# Patient Record
Sex: FEMALE | Race: WHITE | Employment: OTHER | RURAL
[De-identification: names, ages, dates, MRNs, and addresses within clinical notes are randomized per-mention and may not be internally consistent; named-entity substitution may affect disease eponyms.]

---

## 2017-02-08 ENCOUNTER — OFFICE VISIT (OUTPATIENT)
Dept: FAMILY MEDICINE CLINIC | Age: 67
End: 2017-02-08

## 2017-02-08 VITALS
BODY MASS INDEX: 28.12 KG/M2 | RESPIRATION RATE: 16 BRPM | DIASTOLIC BLOOD PRESSURE: 67 MMHG | OXYGEN SATURATION: 98 % | HEART RATE: 76 BPM | SYSTOLIC BLOOD PRESSURE: 128 MMHG | WEIGHT: 163.8 LBS | TEMPERATURE: 97.8 F

## 2017-02-08 DIAGNOSIS — Z11.59 NEED FOR HEPATITIS C SCREENING TEST: ICD-10-CM

## 2017-02-08 DIAGNOSIS — I25.10 CORONARY ARTERY DISEASE INVOLVING NATIVE CORONARY ARTERY OF NATIVE HEART WITHOUT ANGINA PECTORIS: ICD-10-CM

## 2017-02-08 DIAGNOSIS — Z13.31 SCREENING FOR DEPRESSION: ICD-10-CM

## 2017-02-08 DIAGNOSIS — Z13.39 SCREENING FOR ALCOHOLISM: ICD-10-CM

## 2017-02-08 DIAGNOSIS — E11.9 TYPE 2 DIABETES MELLITUS WITHOUT COMPLICATION, WITHOUT LONG-TERM CURRENT USE OF INSULIN (HCC): ICD-10-CM

## 2017-02-08 DIAGNOSIS — F17.200 SMOKING: ICD-10-CM

## 2017-02-08 DIAGNOSIS — Z00.00 ROUTINE GENERAL MEDICAL EXAMINATION AT A HEALTH CARE FACILITY: Primary | ICD-10-CM

## 2017-02-08 DIAGNOSIS — I10 ESSENTIAL HYPERTENSION WITH GOAL BLOOD PRESSURE LESS THAN 130/85: ICD-10-CM

## 2017-02-08 NOTE — PROGRESS NOTES
Donovan Castillo is a 74 y.o. female presenting for/with: Annual Wellness Visit    HPI:  Hypertension. Blood pressures have been improving. Management at last visit included changing regimen to boost losartan to 25mg BID. Current regimen: angiotensin II receptor antagonist, beta-blocker and nitrate. Symptoms include no symptoms. Patient denies chest pain, palpitations, dyspnea. Lab review:   Lab Results   Component Value Date/Time    Sodium 141 07/18/2016 08:27 AM    Potassium 4.1 07/18/2016 08:27 AM    Chloride 101 07/18/2016 08:27 AM    CO2 20 07/18/2016 08:27 AM    Glucose 143 07/18/2016 08:27 AM    BUN 15 07/18/2016 08:27 AM    Creatinine 0.67 07/18/2016 08:27 AM    BUN/Creatinine ratio 22 07/18/2016 08:27 AM    GFR est  07/18/2016 08:27 AM    GFR est non-AA 91 07/18/2016 08:27 AM    Calcium 9.4 07/18/2016 08:27 AM     Diabetes. Sugars controlled moderately  Hypoglycemia: none  Tolerating current treatment well  Current medications include metformin ER 1g/d    Lab Results   Component Value Date/Time    Hemoglobin A1c 8.6 10/17/2016 09:12 AM    Hemoglobin A1c 7.6 07/18/2016 08:27 AM    Hemoglobin A1c 8.0 04/18/2016 08:36 AM    Glucose 143 07/18/2016 08:27 AM    Glucose  07/29/2015 09:55 AM    Microalb/Creat ratio (ug/mg creat.) 59.0 10/17/2016 09:13 AM    LDL, calculated 56 07/18/2016 08:27 AM    Creatinine 0.67 07/18/2016 08:27 AM     Lab Results   Component Value Date/Time    Microalb/Creat ratio (ug/mg creat.) 59.0 10/17/2016 09:13 AM    Microalbumin, urine 55.8 10/17/2016 09:13 AM     Last eye exam performed  Summer 2016,  Dr. Chris Jorgensen, no DR. Last foot exam performed 1/2016, warm, good capillary refill, normal DP and PT pulses and normal monofilament exam    PMH, SH, Medications/Allergies: reviewed, on chart. Still smoking 1 ppd.     ROS:  Constitutional: No fever, chills or weight loss  Respiratory: No cough, SOB   CV: No chest pain or Palpitations    Visit Vitals    /67 (BP 1 Location: Right arm, BP Patient Position: Sitting)    Pulse 76    Temp 97.8 °F (36.6 °C) (Oral)    Resp 16    Wt 163 lb 12.8 oz (74.3 kg)    SpO2 98%    BMI 28.12 kg/m2     Wt Readings from Last 3 Encounters:   02/08/17 163 lb 12.8 oz (74.3 kg)   12/29/16 164 lb 9.6 oz (74.7 kg)   11/16/16 165 lb (74.8 kg)     BP Readings from Last 3 Encounters:   02/08/17 128/67   12/29/16 155/80   11/16/16 169/69     Physical Examination: General appearance - alert, well appearing, and in no distress  Mental status - alert, oriented to person, place, and time  Eyes - pupils equal and reactive, extraocular eye movements intact. R lower lid with 2mm pustule to middle of the ciliary margin with mild erythema surrounding without superficial spreading erythema. ENT - bilateral external ears and nose normal. Normal lips  Neck - supple, no significant adenopathy, no thyromegaly or mass  Lymphatics - no palpable lymphadenopathy, no hepatosplenomegaly  Chest - clear to auscultation, no wheezes, rales or rhonchi, symmetric air entry  Heart - normal rate, regular rhythm, normal S1, S2, no murmurs, rubs, clicks or gallops  Extremities - peripheral pulses normal, no pedal edema, no clubbing or cyanosis. A/P:  HLD/CHD  Asyx, on good regimen. Doing well on Crestor. Labs in goal 7/16. Con't. Work on [employer] Recheck lipids. HTN  Better, but not to toal. Boost losartan to 50mg BID. Recheck BMP. DM2  Weight improving. Will con't current tx for now, recheck A1c. Plan boost metformin to 1500mg/d if not in goal. Pt plans arr DM eye check. Smoking  Still smoking about a pack a day. Hasn't cut back or tried chantix. Has box of pills at home. Thinking about using it. Brief intervention today. Re-ordered chantix. F/U 6wk/PRN    ______________________________________________________________________    Messi Cardona is a 74 y.o. female and presents for annual Medicare Wellness Visit.     Problem List: Reviewed with patient and discussed risk factors. Patient Active Problem List   Diagnosis Code    HTN (hypertension) I5    H/O total hip arthroplasty Z96.649    GERD (gastroesophageal reflux disease) K21.9    OA (osteoarthritis) M19.90    Hyperlipidemia E78.5    CHD (coronary heart disease) I25.10    Right leg DVT (HCC) I82.401    DM2 (diabetes mellitus, type 2) ([facility] [address] E11.9    Advance directive discussed with patient Z71.89    Smoking F17.200       Current medical providers:  Patient Care Team:  Gopi Amaya MD as PCP - General (Family Practice)    PMH, , Medications/Allergies: reviewed, on chart. Female Alcohol Screening: On any occasion during the past 3 months, have you had more than 3 drinks containing alcohol? No    Do you average more than 7 drinks per week? No  ROS:  Constitutional: No fever, chills or weight loss  Respiratory: No cough, SOB   CV: No chest pain or Palpitations    Objective:  Visit Vitals    /67 (BP 1 Location: Right arm, BP Patient Position: Sitting)    Pulse 76    Temp 97.8 °F (36.6 °C) (Oral)    Resp 16    Wt 163 lb 12.8 oz (74.3 kg)    SpO2 98%    BMI 28.12 kg/m2    Body mass index is 28.12 kg/(m^2). Assessment of cognitive impairment: Alert and oriented x 3    Depression Screen:   PHQ 2 / 9, over the last two weeks 11/16/2016   Little interest or pleasure in doing things Not at all   Feeling down, depressed or hopeless Not at all   Total Score PHQ 2 0       Fall Risk Assessment:    Fall Risk Assessment, last 12 mths 11/16/2016   Able to walk? Yes   Fall in past 12 months? No       Functional Ability:   Does the patient exhibit a steady gait? yes   How long did it take the patient to get up and walk from a sitting position? 2s   Is the patient self reliant?  (ie can do own laundry, meals, household chores)  yes     Does the patient handle his/her own medications? yes     Does the patient handle his/her own money?    yes     Is the patients home safe (ie good lighting, handrails on stairs and bath, etc.)? YES     Did you notice or did patient express any hearing difficulties? no     Did you notice or did patient express any vision difficulties? no       Advance Care Planning:   Patient was offered the opportunity to discuss advance care planning:  yes     Does patient have an Advance Directive:  yes   If no, did you provide information on Caring Connections? NA     Plan:      Orders Placed This Encounter    COLLECTION VENOUS BLOOD,VENIPUNCTURE    Depression Screen Annual    HEMOGLOBIN A1C WITH EAG    METABOLIC PANEL, COMPREHENSIVE    HCV AB W/RFLX TO ORACIO     DIABETES FOOT EXAM       Health Maintenance   Topic Date Due    Hepatitis C Screening  1950    FOBT Q 1 YEAR AGE 50-75  04/22/1998    ZOSTER VACCINE AGE 60>  04/22/2008    OSTEOPOROSIS SCREENING (DEXA)  04/22/2013    EYE EXAM RETINAL OR DILATED Q1  10/26/2016    MEDICARE YEARLY EXAM  01/18/2017    FOOT EXAM Q1  01/18/2017    HEMOGLOBIN A1C Q6M  04/17/2017    LIPID PANEL Q1  07/18/2017    MICROALBUMIN Q1  10/17/2017    GLAUCOMA SCREENING Q2Y  10/26/2017    BREAST CANCER SCRN MAMMOGRAM  02/28/2018    DTaP/Tdap/Td series (2 - Td) 05/13/2024    Pneumococcal 65+ Low/Medium Risk  Completed    INFLUENZA AGE 9 TO ADULT  Completed       *Patient verbalized understanding and agreement with the plan. A copy of the After Visit Summary with personalized health plan was given to the patient today.

## 2017-02-08 NOTE — MR AVS SNAPSHOT
Visit Information Date & Time Provider Department Dept. Phone Encounter #  
 2/8/2017  7:50 AM Renetta Boo MD [address] [place] [id number] Follow-up Instructions Return in about 3 months (around 5/8/2017). Upcoming Health Maintenance Date Due Hepatitis C Screening 1950 FOBT Q 1 YEAR AGE 50-75 4/22/1998 ZOSTER VACCINE AGE 60> 4/22/2008 OSTEOPOROSIS SCREENING (DEXA) 4/22/2013 EYE EXAM RETINAL OR DILATED Q1 10/26/2016 MEDICARE YEARLY EXAM 1/18/2017 FOOT EXAM Q1 1/18/2017 HEMOGLOBIN A1C Q6M 4/17/2017 LIPID PANEL Q1 7/18/2017 MICROALBUMIN Q1 10/17/2017 GLAUCOMA SCREENING Q2Y 10/26/2017 BREAST CANCER SCRN MAMMOGRAM 2/28/2018 DTaP/Tdap/Td series (2 - Td) 5/13/2024 Allergies as of 2/8/2017  Review Complete On: 2/8/2017 By: Renetta Boo MD  
  
 Severity Noted Reaction Type Reactions Sulfa (Sulfonamide Antibiotics)  09/14/2015    Hives Lipitor [Atorvastatin] Low 10/19/2015   Topical Rash Phototoxic reaction Current Immunizations  Reviewed on 11/16/2016 Name Date DTaP 5/13/2014 Influenza High Dose Vaccine PF 11/16/2016 Influenza Vaccine 10/19/2015, 10/8/2014 Pneumococcal Conjugate (PCV-13) 10/8/2014 Pneumococcal Polysaccharide (PPSV-23) 1/18/2016 Not reviewed this visit You Were Diagnosed With   
  
 Codes Comments Routine general medical examination at a health care facility    -  Primary ICD-10-CM: Z00.00 ICD-9-CM: V70.0 Essential hypertension with goal blood pressure less than 130/85     ICD-10-CM: I10 
ICD-9-CM: 401.9 Coronary artery disease involving native coronary artery of native heart without angina pectoris     ICD-10-CM: I25.10 ICD-9-CM: 414.01 Smoking     ICD-10-CM: F17.200 ICD-9-CM: 305.1 Type 2 diabetes mellitus without complication, without long-term current use of insulin (HCC)     ICD-10-CM: E11.9 ICD-9-CM: 250.00 Need for hepatitis C screening test     ICD-10-CM: Z11.59 
ICD-9-CM: V73.89 Screening for alcoholism     ICD-10-CM: Z13.89 ICD-9-CM: V79.1 Screening for depression     ICD-10-CM: Z13.89 ICD-9-CM: V79.0 Vitals BP Pulse Temp Resp Weight(growth percentile) SpO2  
 128/67 (BP 1 Location: Right arm, BP Patient Position: Sitting) 76 97.8 °F (36.6 °C) (Oral) 16 163 lb 12.8 oz (74.3 kg) 98% BMI OB Status Smoking Status 28.12 kg/m2 Menopause Former Smoker BMI and BSA Data Body Mass Index Body Surface Area  
 28.12 kg/m 2 1.83 m 2 Preferred Pharmacy Pharmacy Name Phone [facility] [place] - [address] [phone] Your Updated Medication List  
  
   
This list is accurate as of: 2/8/17  8:19 AM.  Always use your most recent med list.  
  
  
  
  
 aspirin delayed-release 81 mg tablet Take  by mouth daily. Blood-Glucose Meter monitoring kit Use to check sugar daily as directed for type 2 diabetes, not on insulin Calcium Carbonate-Vit D3-Min 600 mg calcium- 400 unit Tab Take  by mouth. clopidogrel 75 mg Tab Commonly known as:  PLAVIX Take 1 Tab by mouth daily. cyanocobalamin 1,000 mcg sublingual tablet Commonly known as:  VITAMIN B-12 Take 1,000 mcg by mouth daily. glucose blood VI test strips strip Commonly known as:  ASCENSIA AUTODISC VI, ONE TOUCH ULTRA TEST VI  
Use to check sugar daily dx diabetes, not on insulin. DX e11.2  
  
 isosorbide mononitrate ER 30 mg tablet Commonly known as:  IMDUR Take 1 Tab by mouth daily. Lancets Misc Use to check sugar daily as directed DXe11.2  
  
 losartan 50 mg tablet Commonly known as:  COZAAR Take 1 Tab by mouth two (2) times a day. Indications: pressure and heart  
  
 metFORMIN  mg tablet Commonly known as:  GLUCOPHAGE XR Take 2 Tabs by mouth daily (with dinner). Indications: TYPE 2 DIABETES MELLITUS metoprolol succinate 50 mg XL tablet Commonly known as:  TOPROL-XL Take 1 Tab by mouth daily. multivitamin tablet Commonly known as:  ONE A DAY Take 1 tablet by mouth daily. nitroglycerin 0.4 mg SL tablet Commonly known as:  NITROSTAT  
by SubLINGual route every five (5) minutes as needed for Chest Pain.  
  
 pantoprazole 40 mg tablet Commonly known as:  PROTONIX Take 1 Tab by mouth daily. rosuvastatin 20 mg tablet Commonly known as:  CRESTOR Take 1 Tab by mouth nightly. Indications: heart * varenicline 0.5 mg (11)- 1 mg (42) Dspk Commonly known as:  CHANTIX STARTER SUDHA Take per pack instructions * varenicline 1 mg tablet Commonly known as:  Tien Dose Take 1 Tab by mouth two (2) times a day for 90 days. Indications: to quit smoking * Notice: This list has 2 medication(s) that are the same as other medications prescribed for you. Read the directions carefully, and ask your doctor or other care provider to review them with you. We Performed the Following COLLECTION VENOUS BLOOD,VENIPUNCTURE N3902922 CPT(R)] [facility] 68 [[id number] [facility]] HCV AB W/RFLX TO ORACIO [30188 CPT(R)] HEMOGLOBIN A1C WITH EAG [59410 CPT(R)]  DIABETES FOOT EXAM [HM7 Custom] METABOLIC PANEL, COMPREHENSIVE [03659 CPT(R)] Follow-up Instructions Return in about 3 months (around 5/8/2017). Patient Instructions If you have any questions regarding [employer], you may call [employer] support at [phone]. Introducing [facility] & HEALTH [facility]! Dear Jose Presley: Thank you for requesting a [employer] account. Our records indicate that you have previously registered for a [employer] account but its currently inactive. Please call our [employer] support line at [phone]. Additional Information If you have questions, please visit the Frequently Asked Questions section of the [employer] website at [email or web address] Yellow Pages. [email or web address] Remember, MyChart is NOT to be used for urgent needs. For medical emergencies, dial 911. Now available from your iPhone and Android! Please provide this summary of care documentation to your next provider. Your primary care clinician is listed as Idris Jimenez. If you have any questions after today's visit, please call [phone].

## 2017-02-09 LAB
ALBUMIN SERPL-MCNC: 4.1 G/DL (ref 3.6–4.8)
ALBUMIN/GLOB SERPL: 1.7 {RATIO} (ref 1.1–2.5)
ALP SERPL-CCNC: 59 IU/L (ref 39–117)
ALT SERPL-CCNC: 15 IU/L (ref 0–32)
AST SERPL-CCNC: 17 IU/L (ref 0–40)
BILIRUB SERPL-MCNC: 0.2 MG/DL (ref 0–1.2)
BUN SERPL-MCNC: 16 MG/DL (ref 8–27)
BUN/CREAT SERPL: 20 (ref 11–26)
CALCIUM SERPL-MCNC: 9.3 MG/DL (ref 8.7–10.3)
CHLORIDE SERPL-SCNC: 101 MMOL/L (ref 96–106)
CO2 SERPL-SCNC: 22 MMOL/L (ref 18–29)
CREAT SERPL-MCNC: 0.82 MG/DL (ref 0.57–1)
EST. AVERAGE GLUCOSE BLD GHB EST-MCNC: 169 MG/DL
GLOBULIN SER CALC-MCNC: 2.4 G/DL (ref 1.5–4.5)
GLUCOSE SERPL-MCNC: 139 MG/DL (ref 65–99)
HBA1C MFR BLD: 7.5 % (ref 4.8–5.6)
HCV AB S/CO SERPL IA: <0.1 S/CO RATIO (ref 0–0.9)
HCV AB SERPL QL IA: NORMAL
POTASSIUM SERPL-SCNC: 5 MMOL/L (ref 3.5–5.2)
PROT SERPL-MCNC: 6.5 G/DL (ref 6–8.5)
SODIUM SERPL-SCNC: 139 MMOL/L (ref 134–144)

## 2017-04-25 RX ORDER — LANCETS 33 GAUGE
EACH MISCELLANEOUS
Qty: 100 LANCET | Refills: 3 | Status: SHIPPED | OUTPATIENT
Start: 2017-04-25 | End: 2018-02-28 | Stop reason: SDUPTHER

## 2017-05-11 ENCOUNTER — OFFICE VISIT (OUTPATIENT)
Dept: FAMILY MEDICINE CLINIC | Age: 67
End: 2017-05-11

## 2017-05-11 VITALS
BODY MASS INDEX: 27.49 KG/M2 | HEIGHT: 64 IN | OXYGEN SATURATION: 96 % | WEIGHT: 161 LBS | HEART RATE: 81 BPM | RESPIRATION RATE: 16 BRPM | DIASTOLIC BLOOD PRESSURE: 77 MMHG | SYSTOLIC BLOOD PRESSURE: 141 MMHG | TEMPERATURE: 97.8 F

## 2017-05-11 DIAGNOSIS — Z12.11 SCREEN FOR COLON CANCER: ICD-10-CM

## 2017-05-11 DIAGNOSIS — F17.210 CIGARETTE NICOTINE DEPENDENCE WITHOUT COMPLICATION: ICD-10-CM

## 2017-05-11 DIAGNOSIS — E11.9 TYPE 2 DIABETES MELLITUS WITHOUT COMPLICATION, WITHOUT LONG-TERM CURRENT USE OF INSULIN (HCC): Primary | ICD-10-CM

## 2017-05-11 DIAGNOSIS — F17.200 SMOKING: ICD-10-CM

## 2017-05-11 DIAGNOSIS — M12.812 ROTATOR CUFF ARTHROPATHY, LEFT: ICD-10-CM

## 2017-05-11 DIAGNOSIS — I25.10 CORONARY ARTERY DISEASE INVOLVING NATIVE CORONARY ARTERY OF NATIVE HEART WITHOUT ANGINA PECTORIS: ICD-10-CM

## 2017-05-11 DIAGNOSIS — E78.00 PURE HYPERCHOLESTEROLEMIA: ICD-10-CM

## 2017-05-11 DIAGNOSIS — I10 ESSENTIAL HYPERTENSION: ICD-10-CM

## 2017-05-11 DIAGNOSIS — F17.200 NICOTINE DEPENDENCE, UNSPECIFIED, UNCOMPLICATED: ICD-10-CM

## 2017-05-11 LAB
ALBUMIN UR QL STRIP: 30 MG/L
CREATININE, URINE POC: 200 MG/DL
MICROALBUMIN/CREAT RATIO POC: <30 MG/G

## 2017-05-11 NOTE — PROGRESS NOTES
Portia David is a 69 y.o. female presenting for/with:    Diabetes (3 mo check)    HPI:  Hypertension. Blood pressures have been improving. Management at last visit included changing regimen to boost losartan to 50mg BID. Current regimen: angiotensin II receptor antagonist, beta-blocker and nitrate. Symptoms include no symptoms. Patient denies chest pain, palpitations, dyspnea. Lab review:   Lab Results   Component Value Date/Time    Sodium 139 02/08/2017 08:18 AM    Potassium 5.0 02/08/2017 08:18 AM    Chloride 101 02/08/2017 08:18 AM    CO2 22 02/08/2017 08:18 AM    Glucose 139 02/08/2017 08:18 AM    BUN 16 02/08/2017 08:18 AM    Creatinine 0.82 02/08/2017 08:18 AM    BUN/Creatinine ratio 20 02/08/2017 08:18 AM    GFR est AA 85 02/08/2017 08:18 AM    GFR est non-AA 74 02/08/2017 08:18 AM    Calcium 9.3 02/08/2017 08:18 AM     Diabetes. Sugars controlled moderately  Hypoglycemia: none  Tolerating current treatment well  Current medications include metformin ER 1g/d    Lab Results   Component Value Date/Time    Hemoglobin A1c 7.5 02/08/2017 08:18 AM    Hemoglobin A1c 8.6 10/17/2016 09:12 AM    Hemoglobin A1c 7.6 07/18/2016 08:27 AM    Glucose 139 02/08/2017 08:18 AM    Glucose  07/29/2015 09:55 AM    Microalb/Creat ratio (ug/mg creat.) 59.0 10/17/2016 09:13 AM    LDL, calculated 56 07/18/2016 08:27 AM    Creatinine 0.82 02/08/2017 08:18 AM     Lab Results   Component Value Date/Time    Microalb/Creat ratio (ug/mg creat.) 59.0 10/17/2016 09:13 AM    Microalbumin, urine 55.8 10/17/2016 09:13 AM     Last eye exam performed  Summer 2016,  Dr. Walter mAor, no DR. Last foot exam performed 1/2016, warm, good capillary refill, normal DP and PT pulses and normal monofilament exam    L shoulder pain  Pt has noticed lateral/posterior shoulder pain gradually worsening over the past month.  Has been doing some increased exercises at the [facility] as part of a fitness program.    PMH, SH, Medications/Allergies: reviewed, on chart. Still smoking 1 ppd. ROS:  Constitutional: No fever, chills or weight loss  Respiratory: No cough, SOB   CV: No chest pain or Palpitations    Visit Vitals    /77    Pulse 81    Temp 97.8 °F (36.6 °C) (Oral)    Resp 16    Ht 5' 4\" (1.626 m)    Wt 161 lb (73 kg)    SpO2 96%    BMI 27.64 kg/m2     Wt Readings from Last 3 Encounters:   05/11/17 161 lb (73 kg)   02/08/17 163 lb 12.8 oz (74.3 kg)   12/29/16 164 lb 9.6 oz (74.7 kg)     BP Readings from Last 3 Encounters:   05/11/17 141/77   02/08/17 128/67   12/29/16 155/80     Physical Examination: General appearance - alert, well appearing, and in no distress  Mental status - alert, oriented to person, place, and time  Eyes - pupils equal and reactive, extraocular eye movements intact. ENT - bilateral external ears and nose normal. Normal lips  Neck - supple, no significant adenopathy, no thyromegaly or mass  Lymphatics - no palpable lymphadenopathy, no hepatosplenomegaly  Chest - clear to auscultation, no wheezes, rales or rhonchi, symmetric air entry  Heart - normal rate, regular rhythm, normal S1, S2, no murmurs, rubs, clicks or gallops  Extremities - peripheral pulses normal, no pedal edema, no clubbing or cyanosis. A/P:  HLD/CHD  Asyx, on good regimen. Doing well on Crestor and BP better. Labs in goal 7/16. Con't. Work on [employer] Plan recheck lipids this summer. HTN  Better, pretty much in goal. Con't current tx. DM2  Weight improving. Will con't current tx for now, recheck A1c. Plan boost metformin to 1500mg/d if not in goal. Get notes from DM eye check. Smoking  Still smoking about a pack a day. Still hasn't tried chantix. Has box of pills at home. Thinking about using it. Brief intervention today again. R rotator cuff  Work on activity mod and rotator cuff exercises. Colon ca screen  FIt kit given.     F/U 3mo/PRN

## 2017-05-11 NOTE — PATIENT INSTRUCTIONS
Rotator Cuff: Exercises  Your Care Instructions  Here are some examples of typical rehabilitation exercises for your condition. Start each exercise slowly. Ease off the exercise if you start to have pain. Your doctor or physical therapist will tell you when you can start these exercises and which ones will work best for you. How to do the exercises  Pendulum swing    Note: If you have pain in your back, do not do this exercise. 1. Hold on to a table or the back of a chair with your good arm. Then bend forward a little and let your sore arm hang straight down. This exercise does not use the arm muscles. Rather, use your legs and your hips to create movement that makes your arm swing freely. 2. Use the movement from your hips and legs to guide the slightly swinging arm back and forth like a pendulum (or elephant trunk). Then guide it in circles that start small (about the size of a dinner plate). Make the circles a bit larger each day, as your pain allows. 3. Do this exercise for 5 minutes, 5 to 7 times each day. 4. As you have less pain, try bending over a little farther to do this exercise. This will increase the amount of movement at your shoulder. Posterior stretching exercise    1. Hold the elbow of your injured arm with your other hand. 2. Use your hand to pull your injured arm gently up and across your body. You will feel a gentle stretch across the back of your injured shoulder. 3. Hold for at least 15 to 30 seconds. Then slowly lower your arm. 4. Repeat 2 to 4 times. Up-the-back stretch    Note: Your doctor or physical therapist may want you to wait to do this stretch until you have regained most of your range of motion and strength. You can do this stretch in different ways. Hold any of these stretches for at least 15 to 30 seconds. Repeat them 2 to 4 times. 1. Put your hand in your back pocket. Let it rest there to stretch your shoulder.   2. With your other hand, hold your injured arm (palm outward) behind your back by the wrist. Pull your arm up gently to stretch your shoulder. 3. Next, put a towel over your other shoulder. Put the hand of your injured arm behind your back. Now hold the back end of the towel. With the other hand, hold the front end of the towel in front of your body. Pull gently on the front end of the towel. This will bring your hand farther up your back to stretch your shoulder. Overhead stretch    1. Standing about an arm's length away, grasp onto a solid surface. You could use a countertop, a doorknob, or the back of a sturdy chair. 2. With your knees slightly bent, bend forward with your arms straight. Lower your upper body, and let your shoulders stretch. 3. As your shoulders are able to stretch farther, you may need to take a step or two backward. 4. Hold for at least 15 to 30 seconds. Then stand up and relax. If you had stepped back during your stretch, step forward so you can keep your hands on the solid surface. 5. Repeat 2 to 4 times. Shoulder flexion (lying down)    Note: To make a wand for this exercise, use a piece of PVC pipe or a broom handle with the broom removed. Make the wand about a foot wider than your shoulders. 1. Lie on your back, holding a wand with both hands. Your palms should face down as you hold the wand. 2. Keeping your elbows straight, slowly raise your arms over your head. Raise them until you feel a stretch in your shoulders, upper back, and chest.  3. Hold for 15 to 30 seconds. 4. Repeat 2 to 4 times. Shoulder rotation (lying down)    Note: To make a wand for this exercise, use a piece of PVC pipe or a broom handle with the broom removed. Make the wand about a foot wider than your shoulders. 1. Lie on your back. Hold a wand with both hands with your elbows bent and palms up. 2. Keep your elbows close to your body, and move the wand across your body toward the sore arm. 3. Hold for 8 to 12 seconds. 4. Repeat 2 to 4 times.   Filiberto Arriola climbing (to the side)    Note: Avoid any movement that is straight to your side, and be careful not to arch your back. Your arm should stay about 30 degrees to the front of your side. 1. Stand with your side to a wall so that your fingers can just touch it at an angle about 30 degrees toward the front of your body. 2. Walk the fingers of your injured arm up the wall as high as pain permits. Try not to shrug your shoulder up toward your ear as you move your arm up. 3. Hold that position for a count of at least 15 to 20.  4. Walk your fingers back down to the starting position. 5. Repeat at least 2 to 4 times. Try to reach higher each time. Wall climbing (to the front)    Note: During this stretching exercise, be careful not to arch your back. 1. Face a wall, and stand so your fingers can just touch it. 2. Keeping your shoulder down, walk the fingers of your injured arm up the wall as high as pain permits. (Don't shrug your shoulder up toward your ear.)  3. Hold your arm in that position for at least 15 to 30 seconds. 4. Slowly walk your fingers back down to where you started. 5. Repeat at least 2 to 4 times. Try to reach higher each time. Shoulder blade squeeze    1. Stand with your arms at your sides, and squeeze your shoulder blades together. Do not raise your shoulders up as you squeeze. 2. Hold 6 seconds. 3. Repeat 8 to 12 times. Scapular exercise: Arm reach    1. Lie flat on your back. This exercise is a very slight motion that starts with your arms raised (elbows straight, arms straight). 2. From this position, reach higher toward the moi or ceiling. Keep your elbows straight. All motion should be from your shoulder blade only. 3. Relax your arms back to where you started. 4. Repeat 8 to 12 times. Arm raise to the side    Note: During this strengthening exercise, your arm should stay about 30 degrees to the front of your side.   1. Slowly raise your injured arm to the side, with your thumb facing up. Raise your arm 60 degrees at the most (shoulder level is 90 degrees). 2. Hold the position for 3 to 5 seconds. Then lower your arm back to your side. If you need to, bring your \"good\" arm across your body and place it under the elbow as you lower your injured arm. Use your good arm to keep your injured arm from dropping down too fast.  3. Repeat 8 to 12 times. 4. When you first start out, don't hold any extra weight in your hand. As you get stronger, you may use a 1-pound to 2-pound dumbbell or a small can of food. Shoulder flexor and extensor exercise    Note: These are isometric exercises. That means you contract your muscles without actually moving. · Push forward (flex): Stand facing a wall or doorjamb, about 6 inches or less back. Hold your injured arm against your body. Make a closed fist with your thumb on top. Then gently push your hand forward into the wall with about 25% to 50% of your strength. Don't let your body move backward as you push. Hold for about 6 seconds. Relax for a few seconds. Repeat 8 to 12 times. · Push backward (extend): Stand with your back flat against a wall. Your upper arm should be against the wall, with your elbow bent 90 degrees (your hand straight ahead). Push your elbow gently back against the wall with about 25% to 50% of your strength. Don't let your body move forward as you push. Hold for about 6 seconds. Relax for a few seconds. Repeat 8 to 12 times. Scapular exercise: Wall push-ups    Note: This exercise is best done with your fingers somewhat turned out, rather than straight up and down. 1. Stand facing a wall, about 12 inches to 18 inches away. 2. Place your hands on the wall at shoulder height. 3. Slowly bend your elbows and bring your face to the wall. Keep your back and hips straight. 4. Push back to where you started. 5. Repeat 8 to 12 times. 6. When you can do this exercise against a wall comfortably, you can try it against a counter.  You can then slowly progress to the end of a couch, then to a sturdy chair, and finally to the floor. Scapular exercise: Retraction    Note: For this exercise, you will need elastic exercise material, such as surgical tubing or Thera-Band. 1. Put the band around a solid object at about waist level. (A bedpost will work well.) Each hand should hold an end of the band. 2. With your elbows at your sides and bent to 90 degrees, pull the band back. Your shoulder blades should move toward each other. Then move your arms back where you started. 3. Repeat 8 to 12 times. 4. If you have good range of motion in your shoulders, try this exercise with your arms lifted out to the sides. Keep your elbows at a 90-degree angle. Raise the elastic band up to about shoulder level. Pull the band back to move your shoulder blades toward each other. Then move your arms back where you started. Internal rotator strengthening exercise    1. Start by tying a piece of elastic exercise material to a doorknob. You can use surgical tubing or Thera-Band. 2. Stand or sit with your shoulder relaxed and your elbow bent 90 degrees. Your upper arm should rest comfortably against your side. Squeeze a rolled towel between your elbow and your body for comfort. This will help keep your arm at your side. 3. Hold one end of the elastic band in the hand of the painful arm. 4. Slowly rotate your forearm toward your body until it touches your belly. Slowly move it back to where you started. 5. Keep your elbow and upper arm firmly tucked against the towel roll or at your side. 6. Repeat 8 to 12 times. External rotator strengthening exercise- Most important! 10 reps AM and PM for 1 week, then ice shoulder, then 20 reps AM and PM until resolves. 1. Start by tying a piece of elastic exercise material to a doorknob. You can use surgical tubing or Thera-Band. (You may also hold one end of the band in each hand.)  2.  Stand or sit with your shoulder relaxed and your elbow bent 90 degrees. Your upper arm should rest comfortably against your side. Squeeze a rolled towel between your elbow and your body for comfort. This will help keep your arm at your side. 3. Hold one end of the elastic band with the hand of the painful arm. 4. Start with your forearm across your belly. Slowly rotate the forearm out away from your body. Keep your elbow and upper arm tucked against the towel roll or the side of your body until you begin to feel tightness in your shoulder. Slowly move your arm back to where you started. Follow-up care is a key part of your treatment and safety. Be sure to make and go to all appointments, and call your doctor if you are having problems. It's also a good idea to know your test results and keep a list of the medicines you take. Where can you learn more? Go to http://daryn-ibrahima.info/. Enter Ludwin Olivier in the search box to learn more about \"Rotator Cuff: Exercises. \"  Current as of: May 23, 2016  Content Version: 11.2  © 0707-9364 [employer]. Care instructions adapted under license by [employer] (which disclaims liability or warranty for this information). If you have questions about a medical condition or this instruction, always ask your healthcare professional. [address] any warranty or liability for your use of this information. If you have any questions regarding [employer], you may call [employer] support at [phone].

## 2017-05-11 NOTE — MR AVS SNAPSHOT
Visit Information Date & Time Provider Department Dept. Phone Encounter #  
 5/11/2017  7:30 AM Fanny Aparicio MD [address] [place] [address] [id number] Follow-up Instructions Return in about 3 months (around 8/11/2017). Follow-up and Disposition History Your Appointments 8/10/2017  7:30 AM  
ESTABLISHED PATIENT with Fanny Aparicio MD  
[place]  ([facility]) Appt Note: 3 MO F/U  
 [address] [id number] [facility],5[address] [address] [phone]  
  
   
 [address] [facility] [address] Upcoming Health Maintenance Date Due FOBT Q 1 YEAR AGE 50-75 4/22/1998 LIPID PANEL Q1 7/18/2017 INFLUENZA AGE 9 TO ADULT 8/1/2017 HEMOGLOBIN A1C Q6M 8/8/2017 MICROALBUMIN Q1 10/17/2017 FOOT EXAM Q1 2/8/2018 MEDICARE YEARLY EXAM 2/9/2018 BREAST CANCER SCRN MAMMOGRAM 2/28/2018 EYE EXAM RETINAL OR DILATED Q1 3/3/2018 GLAUCOMA SCREENING Q2Y 3/3/2019 DTaP/Tdap/Td series (2 - Td) 5/13/2024 Allergies as of 5/11/2017  Review Complete On: 5/11/2017 By: Fanny Aparicio MD  
  
 Severity Noted Reaction Type Reactions Sulfa (Sulfonamide Antibiotics)  09/14/2015    Hives Lipitor [Atorvastatin] Low 10/19/2015   Topical Rash Phototoxic reaction Current Immunizations  Reviewed on 11/16/2016 Name Date DTaP 5/13/2014 Influenza High Dose Vaccine PF 11/16/2016 Influenza Vaccine 10/19/2015, 10/8/2014 Pneumococcal Conjugate (PCV-13) 10/8/2014 Pneumococcal Polysaccharide (PPSV-23) 1/18/2016 Not reviewed this visit You Were Diagnosed With   
  
 Codes Comments Type 2 diabetes mellitus without complication, without long-term current use of insulin (HCC)    -  Primary ICD-10-CM: E11.9 ICD-9-CM: 250.00 Coronary artery disease involving native coronary artery of native heart without angina pectoris     ICD-10-CM: I25.10 ICD-9-CM: 414.01   
 Essential hypertension     ICD-10-CM: I10 
ICD-9-CM: 401.9 Pure hypercholesterolemia     ICD-10-CM: E78.00 ICD-9-CM: 272.0 Smoking     ICD-10-CM: F17.200 ICD-9-CM: 305.1 Cigarette nicotine dependence without complication     CVR-84-RU: F17.210 ICD-9-CM: 305.1 Nicotine dependence, unspecified, uncomplicated     GYT-66-XW: F17.200 ICD-9-CM: 305.1 Rotator cuff arthropathy, left     ICD-10-CM: K69.092 ICD-9-CM: 716.81 Screen for colon cancer     ICD-10-CM: Z12.11 ICD-9-CM: V76.51 Vitals BP Pulse Temp Resp Height(growth percentile) Weight(growth percentile) 141/77 81 97.8 °F (36.6 °C) (Oral) 16 5' 4\" (1.626 m) 161 lb (73 kg) SpO2 BMI OB Status Smoking Status 96% 27.64 kg/m2 Menopause Former Smoker Vitals History BMI and BSA Data Body Mass Index Body Surface Area  
 27.64 kg/m 2 1.82 m 2 Preferred Pharmacy Pharmacy Name Phone [facility] [place] [address], [place] - [address] [phone] Your Updated Medication List  
  
   
This list is accurate as of: 5/11/17  8:36 AM.  Always use your most recent med list.  
  
  
  
  
 aspirin delayed-release 81 mg tablet Take  by mouth daily. Blood-Glucose Meter monitoring kit Use to check sugar daily as directed for type 2 diabetes, not on insulin Calcium Carbonate-Vit D3-Min 600 mg calcium- 400 unit Tab Take  by mouth. clopidogrel 75 mg Tab Commonly known as:  PLAVIX TAKE ONE TABLET BY MOUTH ONCE DAILY CRESTOR 20 mg tablet Generic drug:  rosuvastatin TAKE 1 TABLET BY MOUTH NIGHTLY -- INDICATIONS -- HEART  
  
 cyanocobalamin 1,000 mcg sublingual tablet Commonly known as:  VITAMIN B-12 Take 1,000 mcg by mouth daily. glucose blood VI test strips strip Commonly known as:  ONETOUCH ULTRA TEST  
USE 1 TEST STRIP EACH TIME TO TEST BLOOD SUGAR DAILY FOR DIABETES  
  
 isosorbide mononitrate ER 30 mg tablet Commonly known as:  IMDUR Take 1 Tab by mouth daily. Indications: heart  
  
 lancets 33 gauge Misc Commonly known as: One Touch Lillette Montana USE 1 LANCET EACH TIME TO TEST BLOOD SUGAR DAILY AS DIRECTED  
  
 losartan 50 mg tablet Commonly known as:  COZAAR Take 1 Tab by mouth two (2) times a day. Indications: pressure and heart  
  
 metFORMIN  mg tablet Commonly known as:  GLUCOPHAGE XR  
TAKE TWO TABLETS BY MOUTH ONCE DAILY WITH  DINNER  FOR  TYPE  2  DIABETES  MELLITUS  
  
 metoprolol succinate 50 mg XL tablet Commonly known as:  TOPROL-XL Take 1 Tab by mouth daily. Indications: pressure and heart  
  
 multivitamin tablet Commonly known as:  ONE A DAY Take 1 tablet by mouth daily. nitroglycerin 0.4 mg SL tablet Commonly known as:  NITROSTAT  
by SubLINGual route every five (5) minutes as needed for Chest Pain.  
  
 pantoprazole 40 mg tablet Commonly known as:  PROTONIX Take 1 Tab by mouth daily. Indications: prevent ulcer, stomach We Performed the Following AMB POC URINE, MICROALBUMIN, SEMIQUANT (3 RESULTS) [46596 CPT(R)] HEMOGLOBIN A1C WITH EAG [47762 CPT(R)] [place] SMOKING AND TOBACCO USE CESSATION 3 - 10 MINUTES [43326 CPT(R)] Follow-up Instructions Return in about 3 months (around 8/11/2017). Patient Instructions Rotator Cuff: Exercises Your Care Instructions Here are some examples of typical rehabilitation exercises for your condition. Start each exercise slowly. Ease off the exercise if you start to have pain. Your doctor or physical therapist will tell you when you can start these exercises and which ones will work best for you. How to do the exercises Pendulum swing Note: If you have pain in your back, do not do this exercise. 1. Hold on to a table or the back of a chair with your good arm. Then bend forward a little and let your sore arm hang straight down.  This exercise does not use the arm muscles. Rather, use your legs and your hips to create movement that makes your arm swing freely. 2. Use the movement from your hips and legs to guide the slightly swinging arm back and forth like a pendulum (or elephant trunk). Then guide it in circles that start small (about the size of a dinner plate). Make the circles a bit larger each day, as your pain allows. 3. Do this exercise for 5 minutes, 5 to 7 times each day. 4. As you have less pain, try bending over a little farther to do this exercise. This will increase the amount of movement at your shoulder. Posterior stretching exercise 1. Hold the elbow of your injured arm with your other hand. 2. Use your hand to pull your injured arm gently up and across your body. You will feel a gentle stretch across the back of your injured shoulder. 3. Hold for at least 15 to 30 seconds. Then slowly lower your arm. 4. Repeat 2 to 4 times. Up-the-back stretch Note: Your doctor or physical therapist may want you to wait to do this stretch until you have regained most of your range of motion and strength. You can do this stretch in different ways. Hold any of these stretches for at least 15 to 30 seconds. Repeat them 2 to 4 times. 1. Put your hand in your back pocket. Let it rest there to stretch your shoulder. 2. With your other hand, hold your injured arm (palm outward) behind your back by the wrist. Pull your arm up gently to stretch your shoulder. 3. Next, put a towel over your other shoulder. Put the hand of your injured arm behind your back. Now hold the back end of the towel. With the other hand, hold the front end of the towel in front of your body. Pull gently on the front end of the towel. This will bring your hand farther up your back to stretch your shoulder. Overhead stretch 1. Standing about an arm's length away, grasp onto a solid surface. You could use a countertop, a doorknob, or the back of a sturdy chair. 2. With your knees slightly bent, bend forward with your arms straight. Lower your upper body, and let your shoulders stretch. 3. As your shoulders are able to stretch farther, you may need to take a step or two backward. 4. Hold for at least 15 to 30 seconds. Then stand up and relax. If you had stepped back during your stretch, step forward so you can keep your hands on the solid surface. 5. Repeat 2 to 4 times. Shoulder flexion (lying down) Note: To make a wand for this exercise, use a piece of PVC pipe or a broom handle with the broom removed. Make the wand about a foot wider than your shoulders. 1. Lie on your back, holding a wand with both hands. Your palms should face down as you hold the wand. 2. Keeping your elbows straight, slowly raise your arms over your head. Raise them until you feel a stretch in your shoulders, upper back, and chest. 
3. Hold for 15 to 30 seconds. 4. Repeat 2 to 4 times. Shoulder rotation (lying down) Note: To make a wand for this exercise, use a piece of PVC pipe or a broom handle with the broom removed. Make the wand about a foot wider than your shoulders. 1. Lie on your back. Hold a wand with both hands with your elbows bent and palms up. 2. Keep your elbows close to your body, and move the wand across your body toward the sore arm. 3. Hold for 8 to 12 seconds. 4. Repeat 2 to 4 times. Wall climbing (to the side) Note: Avoid any movement that is straight to your side, and be careful not to arch your back. Your arm should stay about 30 degrees to the front of your side. 1. Stand with your side to a wall so that your fingers can just touch it at an angle about 30 degrees toward the front of your body. 2. Walk the fingers of your injured arm up the wall as high as pain permits. Try not to shrug your shoulder up toward your ear as you move your arm up. 3. Hold that position for a count of at least 15 to 20. 4. Walk your fingers back down to the starting position. 5. Repeat at least 2 to 4 times. Try to reach higher each time. Wall climbing (to the front) Note: During this stretching exercise, be careful not to arch your back. 1. Face a wall, and stand so your fingers can just touch it. 2. Keeping your shoulder down, walk the fingers of your injured arm up the wall as high as pain permits. (Don't shrug your shoulder up toward your ear.) 3. Hold your arm in that position for at least 15 to 30 seconds. 4. Slowly walk your fingers back down to where you started. 5. Repeat at least 2 to 4 times. Try to reach higher each time. Shoulder blade squeeze 1. Stand with your arms at your sides, and squeeze your shoulder blades together. Do not raise your shoulders up as you squeeze. 2. Hold 6 seconds. 3. Repeat 8 to 12 times. Scapular exercise: Arm reach 1. Lie flat on your back. This exercise is a very slight motion that starts with your arms raised (elbows straight, arms straight). 2. From this position, reach higher toward the moi or ceiling. Keep your elbows straight. All motion should be from your shoulder blade only. 3. Relax your arms back to where you started. 4. Repeat 8 to 12 times. Arm raise to the side Note: During this strengthening exercise, your arm should stay about 30 degrees to the front of your side. 1. Slowly raise your injured arm to the side, with your thumb facing up. Raise your arm 60 degrees at the most (shoulder level is 90 degrees). 2. Hold the position for 3 to 5 seconds. Then lower your arm back to your side. If you need to, bring your \"good\" arm across your body and place it under the elbow as you lower your injured arm. Use your good arm to keep your injured arm from dropping down too fast. 
3. Repeat 8 to 12 times. 4. When you first start out, don't hold any extra weight in your hand. As you get stronger, you may use a 1-pound to 2-pound dumbbell or a small can of food. Shoulder flexor and extensor exercise Note: These are isometric exercises. That means you contract your muscles without actually moving. · Push forward (flex): Stand facing a wall or doorjamb, about 6 inches or less back. Hold your injured arm against your body. Make a closed fist with your thumb on top. Then gently push your hand forward into the wall with about 25% to 50% of your strength. Don't let your body move backward as you push. Hold for about 6 seconds. Relax for a few seconds. Repeat 8 to 12 times. · Push backward (extend): Stand with your back flat against a wall. Your upper arm should be against the wall, with your elbow bent 90 degrees (your hand straight ahead). Push your elbow gently back against the wall with about 25% to 50% of your strength. Don't let your body move forward as you push. Hold for about 6 seconds. Relax for a few seconds. Repeat 8 to 12 times. Scapular exercise: Wall push-ups Note: This exercise is best done with your fingers somewhat turned out, rather than straight up and down. 1. Stand facing a wall, about 12 inches to 18 inches away. 2. Place your hands on the wall at shoulder height. 3. Slowly bend your elbows and bring your face to the wall. Keep your back and hips straight. 4. Push back to where you started. 5. Repeat 8 to 12 times. 6. When you can do this exercise against a wall comfortably, you can try it against a counter. You can then slowly progress to the end of a couch, then to a sturdy chair, and finally to the floor. Scapular exercise: Retraction Note: For this exercise, you will need elastic exercise material, such as surgical tubing or Thera-Band. 1. Put the band around a solid object at about waist level. (A bedpost will work well.) Each hand should hold an end of the band. 2. With your elbows at your sides and bent to 90 degrees, pull the band back. Your shoulder blades should move toward each other.  Then move your arms back where you started. 3. Repeat 8 to 12 times. 4. If you have good range of motion in your shoulders, try this exercise with your arms lifted out to the sides. Keep your elbows at a 90-degree angle. Raise the elastic band up to about shoulder level. Pull the band back to move your shoulder blades toward each other. Then move your arms back where you started. Internal rotator strengthening exercise 1. Start by tying a piece of elastic exercise material to a doorknob. You can use surgical tubing or Thera-Band. 2. Stand or sit with your shoulder relaxed and your elbow bent 90 degrees. Your upper arm should rest comfortably against your side. Squeeze a rolled towel between your elbow and your body for comfort. This will help keep your arm at your side. 3. Hold one end of the elastic band in the hand of the painful arm. 4. Slowly rotate your forearm toward your body until it touches your belly. Slowly move it back to where you started. 5. Keep your elbow and upper arm firmly tucked against the towel roll or at your side. 6. Repeat 8 to 12 times. External rotator strengthening exercise- Most important! 10 reps AM and PM for 1 week, then ice shoulder, then 20 reps AM and PM until resolves. 1. Start by tying a piece of elastic exercise material to a doorknob. You can use surgical tubing or Thera-Band. (You may also hold one end of the band in each hand.) 2. Stand or sit with your shoulder relaxed and your elbow bent 90 degrees. Your upper arm should rest comfortably against your side. Squeeze a rolled towel between your elbow and your body for comfort. This will help keep your arm at your side. 3. Hold one end of the elastic band with the hand of the painful arm. 4. Start with your forearm across your belly. Slowly rotate the forearm out away from your body.  Keep your elbow and upper arm tucked against the towel roll or the side of your body until you begin to feel tightness in your shoulder. Slowly move your arm back to where you started. Follow-up care is a key part of your treatment and safety. Be sure to make and go to all appointments, and call your doctor if you are having problems. It's also a good idea to know your test results and keep a list of the medicines you take. Where can you learn more? Go to http://daryn-ibrahima.info/. Enter Anamika Smith in the search box to learn more about \"Rotator Cuff: Exercises. \" Current as of: May 23, 2016 Content Version: 11.2 © 3372-0127 [employer]. Care instructions adapted under license by [employer] (which disclaims liability or warranty for this information). If you have questions about a medical condition or this instruction, always ask your healthcare professional. [address] any warranty or liability for your use of this information. If you have any questions regarding [employer], you may call [employer] support at [phone]. Patient Instructions History Introducing [facility]! Dear Freddy Jaramillo: Thank you for requesting a [employer] account. Our records indicate that you have previously registered for a [employer] account but its currently inactive. Please call our [employer] support line at [phone]. Additional Information If you have questions, please visit the Frequently Asked Questions section of the [employer] website at [email or web address] Remember, MyChart is NOT to be used for urgent needs. For medical emergencies, dial 911. Now available from your iPhone and Android! Please provide this summary of care documentation to your next provider. Your primary care clinician is listed as Guillermina Jimenez. If you have any questions after today's visit, please call [phone].

## 2017-05-12 LAB
EST. AVERAGE GLUCOSE BLD GHB EST-MCNC: 180 MG/DL
HBA1C MFR BLD: 7.9 % (ref 4.8–5.6)

## 2017-08-10 ENCOUNTER — OFFICE VISIT (OUTPATIENT)
Dept: FAMILY MEDICINE CLINIC | Age: 67
End: 2017-08-10

## 2017-08-10 VITALS
TEMPERATURE: 98.4 F | WEIGHT: 165 LBS | HEART RATE: 78 BPM | BODY MASS INDEX: 28.17 KG/M2 | OXYGEN SATURATION: 96 % | RESPIRATION RATE: 16 BRPM | HEIGHT: 64 IN | DIASTOLIC BLOOD PRESSURE: 87 MMHG | SYSTOLIC BLOOD PRESSURE: 171 MMHG

## 2017-08-10 DIAGNOSIS — I25.10 CORONARY ARTERY DISEASE INVOLVING NATIVE CORONARY ARTERY OF NATIVE HEART WITHOUT ANGINA PECTORIS: ICD-10-CM

## 2017-08-10 DIAGNOSIS — E78.00 PURE HYPERCHOLESTEROLEMIA: ICD-10-CM

## 2017-08-10 DIAGNOSIS — E11.9 TYPE 2 DIABETES MELLITUS WITHOUT COMPLICATION, WITHOUT LONG-TERM CURRENT USE OF INSULIN (HCC): Primary | ICD-10-CM

## 2017-08-10 DIAGNOSIS — I10 ESSENTIAL HYPERTENSION: ICD-10-CM

## 2017-08-10 DIAGNOSIS — R53.83 FATIGUE, UNSPECIFIED TYPE: ICD-10-CM

## 2017-08-10 RX ORDER — HYDROCHLOROTHIAZIDE 12.5 MG/1
12.5 TABLET ORAL DAILY
Qty: 60 TAB | Refills: 3 | Status: SHIPPED | OUTPATIENT
Start: 2017-08-10 | End: 2017-09-08 | Stop reason: DRUGHIGH

## 2017-08-10 RX ORDER — GLIMEPIRIDE 1 MG/1
TABLET ORAL
Qty: 30 TAB | Refills: 11 | Status: SHIPPED | OUTPATIENT
Start: 2017-08-10 | End: 2017-12-01 | Stop reason: SDUPTHER

## 2017-08-10 RX ORDER — METFORMIN HYDROCHLORIDE 500 MG/1
TABLET, EXTENDED RELEASE ORAL
Qty: 270 TAB | Refills: 3 | Status: SHIPPED | OUTPATIENT
Start: 2017-08-10 | End: 2018-02-28 | Stop reason: SDUPTHER

## 2017-08-10 NOTE — PROGRESS NOTES
Leo Santana is a 69 y.o. female presenting for/with:    Diabetes (follow up)    HPI:  Hypertension. Blood pressures have been worsening. Management at last visit included con't current regimen, cardio cut B-blocker to 25mg every day due to fatigue sx. Current regimen: angiotensin II receptor antagonist, beta-blocker and nitrate. Symptoms include no symptoms. Patient denies chest pain, palpitations, dyspnea. Lab review:   Lab Results   Component Value Date/Time    Sodium 139 02/08/2017 08:18 AM    Potassium 5.0 02/08/2017 08:18 AM    Chloride 101 02/08/2017 08:18 AM    CO2 22 02/08/2017 08:18 AM    Glucose 139 02/08/2017 08:18 AM    BUN 16 02/08/2017 08:18 AM    Creatinine 0.82 02/08/2017 08:18 AM    BUN/Creatinine ratio 20 02/08/2017 08:18 AM    GFR est AA 85 02/08/2017 08:18 AM    GFR est non-AA 74 02/08/2017 08:18 AM    Calcium 9.3 02/08/2017 08:18 AM     Diabetes. Sugars controlled moderately, high 100's to low 200's lately. 7 d avg 193, 14d avg 208, 30d avg 223  Hypoglycemia: none  Tolerating current treatment well  Current medications include metformin ER 1g/d    Lab Results   Component Value Date/Time    Hemoglobin A1c 7.9 05/11/2017 08:06 AM    Hemoglobin A1c 7.5 02/08/2017 08:18 AM    Hemoglobin A1c 8.6 10/17/2016 09:12 AM    Glucose 139 02/08/2017 08:18 AM    Glucose  07/29/2015 09:55 AM    Microalb/Creat ratio (ug/mg creat.) 59.0 10/17/2016 09:13 AM    Microalbumin/creat ratio (POC) <30 05/11/2017 08:41 AM    LDL, calculated 56 07/18/2016 08:27 AM    Creatinine 0.82 02/08/2017 08:18 AM     Lab Results   Component Value Date/Time    Microalb/Creat ratio (ug/mg creat.) 59.0 10/17/2016 09:13 AM     Last eye exam performed  Summer 2016,  Dr. Heike Steinberg, lexx DR. Last foot exam performed 1/2016, warm, good capillary refill, normal DP and PT pulses and normal monofilament exam    L shoulder pain  Resolved with home PT. Not bothersome now.     Fatigue  Pt notes gradually worsening fatigue sx for past several months. Reported this to Dr. Timbo Ortiz cardio clinic and rec to consider check for thyroid, CARMINA, Anemia, and cut the toprol XL to 25mg every day. [place] Sleepiness Score: 15    PMH, SH, Medications/Allergies: reviewed, on chart. Still smoking 1 ppd. ROS:  Constitutional: No fever, chills or weight loss  Respiratory: No cough, SOB   CV: No chest pain or Palpitations    Visit Vitals    /87    Pulse 78    Temp 98.4 °F (36.9 °C) (Temporal)    Resp 16    Ht 5' 4\" (1.626 m)    Wt 165 lb (74.8 kg)    SpO2 96%    BMI 28.32 kg/m2     Wt Readings from Last 3 Encounters:   08/10/17 165 lb (74.8 kg)   05/11/17 161 lb (73 kg)   02/08/17 163 lb 12.8 oz (74.3 kg)     BP Readings from Last 3 Encounters:   08/10/17 171/87   05/11/17 141/77   02/08/17 128/67     Physical Examination: General appearance - alert, well appearing, and in no distress  Mental status - alert, oriented to person, place, and time  Eyes - pupils equal and reactive, extraocular eye movements intact. ENT - bilateral external ears and nose normal. Normal lips  Neck - supple, no significant adenopathy, no thyromegaly or mass  Lymphatics - no palpable lymphadenopathy, no hepatosplenomegaly  Chest - clear to auscultation, no wheezes, rales or rhonchi, symmetric air entry  Heart - normal rate, regular rhythm, normal S1, S2, no murmurs, rubs, clicks or gallops  Extremities - peripheral pulses normal, no pedal edema, no clubbing or cyanosis. Foot check: No calluses, no tinea. DP, PT pulses 2+ bilat. Monofilament test normal bilat. A/P:   HLD/CHD  Asyx, on fair regimen (see HTN below). Doing well on Crestor and BP better. Labs in goal 7/16. Con't. Work on [employer] recheck lipids. HTN  Worse again. Add oretic 12.5mg every day. Con 't losartan 50 BID for now, plan change to hyzaar 50/12.5 BID when current bottle runs out. con't lower dose toprol XL for now. DM2  Weight worsening again, with high averages. Boost metformin to 1500mg/d. Recheck A1c. Add Amaryl 1mg prn sugar >200. Smoking  Still smoking about a pack a day. Still hasn't tried chantix. Has box of pills at home. Still thinking about using it. Brief intervention today again. Colon ca screen  Has Fit kit, will drop by sample when she can.     F/U 3mo/PRN

## 2017-08-10 NOTE — MR AVS SNAPSHOT
Visit Information Date & Time Provider Department Dept. Phone Encounter #  
 8/10/2017  7:30 AM Waldo Trevizo MD [facility] [place] [id number] Follow-up Instructions Return in about 4 weeks (around 9/7/2017). Follow-up and Disposition History Upcoming Health Maintenance Date Due COLONOSCOPY 4/22/1968 LIPID PANEL Q1 7/18/2017 INFLUENZA AGE 9 TO ADULT 10/1/2017* HEMOGLOBIN A1C Q6M 11/11/2017 FOOT EXAM Q1 2/8/2018 MEDICARE YEARLY EXAM 2/9/2018 BREAST CANCER SCRN MAMMOGRAM 2/28/2018 EYE EXAM RETINAL OR DILATED Q1 3/3/2018 MICROALBUMIN Q1 5/11/2018 GLAUCOMA SCREENING Q2Y 3/3/2019 DTaP/Tdap/Td series (2 - Td) 5/13/2024 *Topic was postponed. The date shown is not the original due date. Allergies as of 8/10/2017  Review Complete On: 8/10/2017 By: Waldo Trevizo MD  
  
 Severity Noted Reaction Type Reactions Sulfa (Sulfonamide Antibiotics)  09/14/2015    Hives Lipitor [Atorvastatin] Low 10/19/2015   Topical Rash Phototoxic reaction Current Immunizations  Reviewed on 11/16/2016 Name Date DTaP 5/13/2014 Influenza High Dose Vaccine PF 11/16/2016 Influenza Vaccine 10/19/2015, 10/8/2014 Pneumococcal Conjugate (PCV-13) 10/8/2014 Pneumococcal Polysaccharide (PPSV-23) 1/18/2016 Not reviewed this visit You Were Diagnosed With   
  
 Codes Comments Type 2 diabetes mellitus without complication, without long-term current use of insulin (HCC)    -  Primary ICD-10-CM: E11.9 ICD-9-CM: 250.00 Essential hypertension     ICD-10-CM: I10 
ICD-9-CM: 401.9 Pure hypercholesterolemia     ICD-10-CM: E78.00 ICD-9-CM: 272.0 Coronary artery disease involving native coronary artery of native heart without angina pectoris     ICD-10-CM: I25.10 ICD-9-CM: 414.01 Fatigue, unspecified type     ICD-10-CM: R53.83 ICD-9-CM: 780.79 Vitals BP Pulse Temp Resp Height(growth percentile) Weight(growth percentile) 171/87 78 98.4 °F (36.9 °C) (Temporal) 16 5' 4\" (1.626 m) 165 lb (74.8 kg) SpO2 BMI OB Status Smoking Status 96% 28.32 kg/m2 Menopause Former Smoker BMI and BSA Data Body Mass Index Body Surface Area  
 28.32 kg/m 2 1.84 m 2 Preferred Pharmacy Pharmacy Name Phone [facility] [place] [facility], [place] - [address] [phone] Your Updated Medication List  
  
   
This list is accurate as of: 8/10/17  8:25 AM.  Always use your most recent med list.  
  
  
  
  
 aspirin delayed-release 81 mg tablet Take  by mouth daily. Blood-Glucose Meter monitoring kit Use to check sugar daily as directed for type 2 diabetes, not on insulin Calcium Carbonate-Vit D3-Min 600 mg calcium- 400 unit Tab Take  by mouth. clopidogrel 75 mg Tab Commonly known as:  PLAVIX TAKE ONE TABLET BY MOUTH ONCE DAILY CRESTOR 20 mg tablet Generic drug:  rosuvastatin TAKE 1 TABLET BY MOUTH NIGHTLY -- INDICATIONS -- HEART  
  
 cyanocobalamin 1,000 mcg sublingual tablet Commonly known as:  VITAMIN B-12 Take 1,000 mcg by mouth daily. glimepiride 1 mg tablet Commonly known as:  AMARYL Daily as needed sugar >200  
  
 glucose blood VI test strips strip Commonly known as:  ONETOUCH ULTRA TEST  
USE 1 TEST STRIP EACH TIME TO TEST BLOOD SUGAR DAILY FOR DIABETES  
  
 hydroCHLOROthiazide 12.5 mg tablet Commonly known as:  HYDRODIURIL Take 1 Tab by mouth daily. Indications: pressure and fluid  
  
 isosorbide mononitrate ER 30 mg tablet Commonly known as:  IMDUR Take 1 Tab by mouth daily. Indications: heart  
  
 lancets 33 gauge Misc Commonly known as: One Touch Nolon Daubs USE 1 LANCET EACH TIME TO TEST BLOOD SUGAR DAILY AS DIRECTED  
  
 losartan 50 mg tablet Commonly known as:  COZAAR Take 1 Tab by mouth two (2) times a day.  Indications: pressure and heart metFORMIN  mg tablet Commonly known as:  GLUCOPHAGE XR  
TAKE 3 TABLETS BY MOUTH ONCE DAILY WITH  DINNER  FOR  TYPE  2  DIABETES  MELLITUS  
  
 metoprolol succinate 50 mg XL tablet Commonly known as:  TOPROL-XL Take 1 Tab by mouth daily. Indications: pressure and heart  
  
 multivitamin tablet Commonly known as:  ONE A DAY Take 1 tablet by mouth daily. nitroglycerin 0.4 mg SL tablet Commonly known as:  NITROSTAT  
by SubLINGual route every five (5) minutes as needed for Chest Pain.  
  
 pantoprazole 40 mg tablet Commonly known as:  PROTONIX Take 1 Tab by mouth daily. Indications: prevent ulcer, stomach Prescriptions Sent to Pharmacy Refills  
 metFORMIN ER (GLUCOPHAGE XR) 500 mg tablet 3 Sig: TAKE 3 TABLETS BY MOUTH ONCE DAILY WITH  DINNER  FOR  TYPE  2  DIABETES  MELLITUS Class: Normal  
 Pharmacy: [address] [facility] Ph #: [phone]  
 glimepiride (AMARYL) 1 mg tablet 11 Sig: Daily as needed sugar >200 Class: Normal  
 Pharmacy: [address] [place] [address] - [address] [place] [address] November Ph #: [phone]  
 hydroCHLOROthiazide (HYDRODIURIL) 12.5 mg tablet 3 Sig: Take 1 Tab by mouth daily. Indications: pressure and fluid Class: Normal  
 Pharmacy: [facility], [address] [place] [address] [address] Ph #: [phone] Route: Oral  
  
We Performed the Following CBC WITH AUTOMATED DIFF [61810 CPT(R)] HEMOGLOBIN A1C WITH EAG [88929 CPT(R)]  DIABETES FOOT EXAM [7 Custom] LIPID PANEL [29378 CPT(R)] TSH RFX ON ABNORMAL TO FREE T4 [[id number] Custom] VITAMIN D, 25 HYDROXY [id number] CPT(R)] Follow-up Instructions Return in about 4 weeks (around 9/7/2017). Patient Instructions We're boosting the metformin to 3 pills a day.  We've added a water pill to your pressure pill regimen, once you run out of your losartan, I'll give you a combined pill to save you a copay. If you have any questions regarding Third Screen Mediat, you may call Skip Hop support at [phone]. Patient Instructions History Introducing [facility] & HEALTH SERVICES! Dear Wilda MORAN: Thank you for requesting a TixAlert account. Our records indicate that you have previously registered for a TixAlert account but its currently inactive. Please call our TixAlert support line at [phone]. Additional Information If you have questions, please visit the Frequently Asked Questions section of the TixAlert website at [email or web address] Remember, ZoomCaret is NOT to be used for urgent needs. For medical emergencies, dial 911. Now available from your iPhone and Android! Please provide this summary of care documentation to your next provider. Your primary care clinician is listed as Chava Jimenez. If you have any questions after today's visit, please call [phone].

## 2017-08-10 NOTE — PATIENT INSTRUCTIONS
We're boosting the metformin to 3 pills a day. We've added a water pill to your pressure pill regimen, once you run out of your losartan, I'll give you a combined pill to save you a copay. If you have any questions regarding mychart, you may call Sightly support at [phone].

## 2017-08-11 LAB
25(OH)D3+25(OH)D2 SERPL-MCNC: 49 NG/ML (ref 30–100)
BASOPHILS # BLD AUTO: 0 X10E3/UL (ref 0–0.2)
BASOPHILS NFR BLD AUTO: 0 %
CHOLEST SERPL-MCNC: 150 MG/DL (ref 100–199)
EOSINOPHIL # BLD AUTO: 0.3 X10E3/UL (ref 0–0.4)
EOSINOPHIL NFR BLD AUTO: 4 %
ERYTHROCYTE [DISTWIDTH] IN BLOOD BY AUTOMATED COUNT: 13.4 % (ref 12.3–15.4)
EST. AVERAGE GLUCOSE BLD GHB EST-MCNC: 223 MG/DL
HBA1C MFR BLD: 9.4 % (ref 4.8–5.6)
HCT VFR BLD AUTO: 41.8 % (ref 34–46.6)
HDLC SERPL-MCNC: 33 MG/DL
HGB BLD-MCNC: 13.9 G/DL (ref 11.1–15.9)
IMM GRANULOCYTES # BLD: 0 X10E3/UL (ref 0–0.1)
IMM GRANULOCYTES NFR BLD: 0 %
LDLC SERPL CALC-MCNC: 62 MG/DL (ref 0–99)
LYMPHOCYTES # BLD AUTO: 1.7 X10E3/UL (ref 0.7–3.1)
LYMPHOCYTES NFR BLD AUTO: 27 %
MCH RBC QN AUTO: 29.6 PG (ref 26.6–33)
MCHC RBC AUTO-ENTMCNC: 33.3 G/DL (ref 31.5–35.7)
MCV RBC AUTO: 89 FL (ref 79–97)
MONOCYTES # BLD AUTO: 0.5 X10E3/UL (ref 0.1–0.9)
MONOCYTES NFR BLD AUTO: 7 %
NEUTROPHILS # BLD AUTO: 3.9 X10E3/UL (ref 1.4–7)
NEUTROPHILS NFR BLD AUTO: 62 %
PLATELET # BLD AUTO: 307 X10E3/UL (ref 150–379)
RBC # BLD AUTO: 4.7 X10E6/UL (ref 3.77–5.28)
TRIGL SERPL-MCNC: 274 MG/DL (ref 0–149)
TSH SERPL DL<=0.005 MIU/L-ACNC: 2.4 UIU/ML (ref 0.45–4.5)
VLDLC SERPL CALC-MCNC: 55 MG/DL (ref 5–40)
WBC # BLD AUTO: 6.3 X10E3/UL (ref 3.4–10.8)

## 2017-09-08 ENCOUNTER — OFFICE VISIT (OUTPATIENT)
Dept: FAMILY MEDICINE CLINIC | Age: 67
End: 2017-09-08

## 2017-09-08 VITALS
BODY MASS INDEX: 28.17 KG/M2 | HEART RATE: 83 BPM | WEIGHT: 165 LBS | TEMPERATURE: 98.2 F | OXYGEN SATURATION: 98 % | SYSTOLIC BLOOD PRESSURE: 142 MMHG | DIASTOLIC BLOOD PRESSURE: 63 MMHG | HEIGHT: 64 IN

## 2017-09-08 DIAGNOSIS — I10 ESSENTIAL HYPERTENSION: ICD-10-CM

## 2017-09-08 DIAGNOSIS — I25.10 CORONARY ARTERY DISEASE INVOLVING NATIVE CORONARY ARTERY OF NATIVE HEART WITHOUT ANGINA PECTORIS: ICD-10-CM

## 2017-09-08 DIAGNOSIS — F17.200 NICOTINE DEPENDENCE, UNSPECIFIED, UNCOMPLICATED: ICD-10-CM

## 2017-09-08 DIAGNOSIS — Z23 ENCOUNTER FOR IMMUNIZATION: Primary | ICD-10-CM

## 2017-09-08 DIAGNOSIS — E11.9 TYPE 2 DIABETES MELLITUS WITHOUT COMPLICATION, WITHOUT LONG-TERM CURRENT USE OF INSULIN (HCC): ICD-10-CM

## 2017-09-08 DIAGNOSIS — E78.00 PURE HYPERCHOLESTEROLEMIA: ICD-10-CM

## 2017-09-08 RX ORDER — LOSARTAN POTASSIUM AND HYDROCHLOROTHIAZIDE 12.5; 5 MG/1; MG/1
1 TABLET ORAL 2 TIMES DAILY
Qty: 180 TAB | Refills: 3 | Status: SHIPPED | OUTPATIENT
Start: 2017-09-08 | End: 2018-08-29 | Stop reason: SDUPTHER

## 2017-09-08 NOTE — PATIENT INSTRUCTIONS

## 2017-09-08 NOTE — MR AVS SNAPSHOT
Visit Information Date & Time Provider Department Dept. Phone Encounter #  
 9/8/2017  9:10 AM Mouna Shields MD [address] [id number] Follow-up Instructions Return in about 3 months (around 12/8/2017). Upcoming Health Maintenance Date Due COLONOSCOPY 4/22/1968 INFLUENZA AGE 7 TO ADULT 10/1/2017* MEDICARE YEARLY EXAM 2/9/2018 HEMOGLOBIN A1C Q6M 2/10/2018 BREAST CANCER SCRN MAMMOGRAM 2/28/2018 EYE EXAM RETINAL OR DILATED Q1 3/3/2018 MICROALBUMIN Q1 5/11/2018 FOOT EXAM Q1 8/10/2018 LIPID PANEL Q1 8/10/2018 GLAUCOMA SCREENING Q2Y 3/3/2019 DTaP/Tdap/Td series (2 - Td) 5/13/2024 *Topic was postponed. The date shown is not the original due date. Allergies as of 9/8/2017  Review Complete On: 9/8/2017 By: Rachelle Martinez LPN Severity Noted Reaction Type Reactions Sulfa (Sulfonamide Antibiotics)  09/14/2015    Hives Lipitor [Atorvastatin] Low 10/19/2015   Topical Rash Phototoxic reaction Current Immunizations  Reviewed on 11/16/2016 Name Date DTaP 5/13/2014 Influenza High Dose Vaccine PF  Incomplete, 11/16/2016 Influenza Vaccine 10/19/2015, 10/8/2014 Pneumococcal Conjugate (PCV-13) 10/8/2014 Pneumococcal Polysaccharide (PPSV-23) 1/18/2016 Not reviewed this visit You Were Diagnosed With   
  
 Codes Comments Encounter for immunization    -  Primary ICD-10-CM: H53 ICD-9-CM: V03.89 Type 2 diabetes mellitus without complication, without long-term current use of insulin (HCC)     ICD-10-CM: E11.9 ICD-9-CM: 250.00 Essential hypertension     ICD-10-CM: I10 
ICD-9-CM: 401.9 Pure hypercholesterolemia     ICD-10-CM: E78.00 ICD-9-CM: 272.0 Coronary artery disease involving native coronary artery of native heart without angina pectoris     ICD-10-CM: I25.10 ICD-9-CM: 414.01 Nicotine dependence, unspecified, uncomplicated     CJK-29-FO: F17.200 ICD-9-CM: 305.1 Vitals BP Pulse Temp Height(growth percentile) Weight(growth percentile) SpO2  
 142/63 83 98.2 °F (36.8 °C) (Temporal) 5' 4\" (1.626 m) 165 lb (74.8 kg) 98% BMI OB Status Smoking Status 28.32 kg/m2 Menopause Former Smoker BMI and BSA Data Body Mass Index Body Surface Area  
 28.32 kg/m 2 1.84 m 2 Preferred Pharmacy Pharmacy Name Phone [facility] [place] [address], [place] - [address] [phone] Your Updated Medication List  
  
   
This list is accurate as of: 9/8/17  9:38 AM.  Always use your most recent med list.  
  
  
  
  
 aspirin delayed-release 81 mg tablet Take  by mouth daily. Blood-Glucose Meter monitoring kit Use to check sugar daily as directed for type 2 diabetes, not on insulin Calcium Carbonate-Vit D3-Min 600 mg calcium- 400 unit Tab Take  by mouth. clopidogrel 75 mg Tab Commonly known as:  PLAVIX TAKE ONE TABLET BY MOUTH ONCE DAILY CRESTOR 20 mg tablet Generic drug:  rosuvastatin TAKE 1 TABLET BY MOUTH NIGHTLY -- INDICATIONS -- HEART  
  
 cyanocobalamin 1,000 mcg sublingual tablet Commonly known as:  VITAMIN B-12 Take 1,000 mcg by mouth daily. glimepiride 1 mg tablet Commonly known as:  AMARYL Daily as needed sugar >200  
  
 glucose blood VI test strips strip Commonly known as:  ONETOUCH ULTRA TEST  
USE 1 TEST STRIP EACH TIME TO TEST BLOOD SUGAR DAILY FOR DIABETES  
  
 isosorbide mononitrate ER 30 mg tablet Commonly known as:  IMDUR Take 1 Tab by mouth daily. Indications: heart  
  
 lancets 33 gauge Misc Commonly known as: One Touch Henrine Held USE 1 LANCET EACH TIME TO TEST BLOOD SUGAR DAILY AS DIRECTED  
  
 losartan-hydroCHLOROthiazide 50-12.5 mg per tablet Commonly known as:  HYZAAR Take 1 Tab by mouth two (2) times a day. Indications: pressure and heart  
  
 metFORMIN  mg tablet Commonly known as:  GLUCOPHAGE XR  
 TAKE 3 TABLETS BY MOUTH ONCE DAILY WITH  DINNER  FOR  TYPE  2  DIABETES  MELLITUS  
  
 metoprolol succinate 50 mg XL tablet Commonly known as:  TOPROL-XL Take 1 Tab by mouth daily. Indications: pressure and heart  
  
 multivitamin tablet Commonly known as:  ONE A DAY Take 1 tablet by mouth daily. nitroglycerin 0.4 mg SL tablet Commonly known as:  NITROSTAT  
by SubLINGual route every five (5) minutes as needed for Chest Pain.  
  
 pantoprazole 40 mg tablet Commonly known as:  PROTONIX Take 1 Tab by mouth daily. Indications: prevent ulcer, stomach Prescriptions Printed Refills  
 losartan-hydroCHLOROthiazide (HYZAAR) 50-12.5 mg per tablet 3 Sig: Take 1 Tab by mouth two (2) times a day. Indications: pressure and heart Class: Print Route: Oral  
  
We Performed the Following ADMIN INFLUENZA VIRUS VAC [ [facility]] INFLUENZA VIRUS VACCINE, HIGH DOSE SEASONAL, PRESERVATIVE FREE [31121 CPT(R)] Follow-up Instructions Return in about 3 months (around 12/8/2017). Patient Instructions Learning About Benefits From Quitting Smoking How does quitting smoking make you healthier? If you're thinking about quitting smoking, you may have a few reasons to be smoke-free. Your health may be one of them. · When you quit smoking, you lower your risks for cancer, lung disease, heart attack, stroke, blood vessel disease, and blindness from macular degeneration. · When you're smoke-free, you get sick less often, and you heal faster. You are less likely to get colds, flu, bronchitis, and pneumonia. · As a nonsmoker, you may find that your mood is better and you are less stressed. When and how will you feel healthier? Quitting has real health benefits that start from day 1 of being smoke-free. And the longer you stay smoke-free, the healthier you get and the better you feel. The first hours · After just 20 minutes, your blood pressure and heart rate go down. That means there's less stress on your heart and blood vessels. · Within 12 hours, the level of carbon monoxide in your blood drops back to normal. That makes room for more oxygen. With more oxygen in your body, you may notice that you have more energy than when you smoked. After 2 weeks · Your lungs start to work better. · Your risk of heart attack starts to drop. After 1 month · When your lungs are clear, you cough less and breathe deeper, so it's easier to be active. · Your sense of taste and smell return. That means you can enjoy food more than you have since you started smoking. Over the years · After 1 year, your risk of heart disease is half what it would be if you kept smoking. · After 5 years, your risk of stroke starts to shrink. Within a few years after that, it's about the same as if you'd never smoked. · After 10 years, your risk of dying from lung cancer is cut by about half. And your risk for many other types of cancer is lower too. How would quitting help others in your life? When you quit smoking, you improve the health of everyone who now breathes in your smoke. · Their heart, lung, and cancer risks drop, much like yours. · They are sick less. For babies and small children, living smoke-free means they're less likely to have ear infections, pneumonia, and bronchitis. · If you're a woman who is or will be pregnant someday, quitting smoking means a healthier . · Children who are close to you are less likely to become adult smokers. Where can you learn more? Go to http://daryn-ibrahima.info/. Enter [id number] in the search box to learn more about \"Learning About Benefits From Quitting Smoking. \" Current as of: 2017 Content Version: 11.3 © 1113-8268 [employer].  Care instructions adapted under license by [employer] (which disclaims liability or warranty for this information). If you have questions about a medical condition or this instruction, always ask your healthcare professional. [address] any warranty or liability for your use of this information. Introducing [facility] & HEALTH SERVICES! Dear Alexis Keith: Thank you for requesting a discoapi account. Our records indicate that you have previously registered for a discoapi account but its currently inactive. Please call our discoapi support line at [phone]. Additional Information If you have questions, please visit the Frequently Asked Questions section of the discoapi website at [email or web address] Remember, discoapi is NOT to be used for urgent needs. For medical emergencies, dial 911. Now available from your iPhone and Android! Please provide this summary of care documentation to your next provider. Your primary care clinician is listed as Chetna Jimenez. If you have any questions after today's visit, please call [phone].

## 2017-09-08 NOTE — PROGRESS NOTES
Nitesh Cooper is a 69 y.o. female presenting for/with:    Hypertension (follow up on bp)    HPI:  Hypertension. Blood pressures have been worsening. Management at last visit included con't current regimen, cardio cut B-blocker to 25mg every day due to fatigue sx. Current regimen: angiotensin II receptor antagonist, beta-blocker and nitrate. Symptoms include no symptoms. Patient denies chest pain, palpitations, dyspnea. Lab review:   Lab Results   Component Value Date/Time    Sodium 139 02/08/2017 08:18 AM    Potassium 5.0 02/08/2017 08:18 AM    Chloride 101 02/08/2017 08:18 AM    CO2 22 02/08/2017 08:18 AM    Glucose 139 02/08/2017 08:18 AM    BUN 16 02/08/2017 08:18 AM    Creatinine 0.82 02/08/2017 08:18 AM    BUN/Creatinine ratio 20 02/08/2017 08:18 AM    GFR est AA 85 02/08/2017 08:18 AM    GFR est non-AA 74 02/08/2017 08:18 AM    Calcium 9.3 02/08/2017 08:18 AM     Diabetes. Sugars controlled moderately, high 100's to low 200's lately. 7 d avg 193, 14d avg 208, 30d avg 223  Hypoglycemia: none  Tolerating current treatment well  Current medications include metformin ER 1g/d    Lab Results   Component Value Date/Time    Hemoglobin A1c 9.4 08/10/2017 08:15 AM    Hemoglobin A1c 7.9 05/11/2017 08:06 AM    Hemoglobin A1c 7.5 02/08/2017 08:18 AM    Glucose 139 02/08/2017 08:18 AM    Glucose  07/29/2015 09:55 AM    Microalb/Creat ratio (ug/mg creat.) 59.0 10/17/2016 09:13 AM    Microalbumin/creat ratio (POC) <30 05/11/2017 08:41 AM    LDL, calculated 62 08/10/2017 08:15 AM    Creatinine 0.82 02/08/2017 08:18 AM     Lab Results   Component Value Date/Time    Microalb/Creat ratio (ug/mg creat.) 59.0 10/17/2016 09:13 AM     Last eye exam performed  Summer 2016,  Dr. Macho Hung, lexx MADRID Last foot exam performed 1/2016, warm, good capillary refill, normal DP and PT pulses and normal monofilament exam    L shoulder pain  Resolved with home PT. Not bothersome now.     Fatigue  Pt notes gradually worsening fatigue sx for past several months. Reported this to Dr. Timbo Ortiz cardio clinic and rec to consider check for thyroid, CARMINA, Anemia, and cut the toprol XL to 25mg every day. PMH, SH, Medications/Allergies: reviewed, on chart. Still smoking 1 ppd. ROS:  Constitutional: No fever, chills or weight loss  Respiratory: No cough, SOB   CV: No chest pain or Palpitations    Visit Vitals    /63    Pulse 83    Temp 98.2 °F (36.8 °C) (Temporal)    Ht 5' 4\" (1.626 m)    Wt 165 lb (74.8 kg)    SpO2 98%    BMI 28.32 kg/m2     Wt Readings from Last 3 Encounters:   09/08/17 165 lb (74.8 kg)   08/10/17 165 lb (74.8 kg)   05/11/17 161 lb (73 kg)     BP Readings from Last 3 Encounters:   09/08/17 142/63   08/10/17 171/87   05/11/17 141/77     Physical Examination: General appearance - alert, well appearing, and in no distress  Mental status - alert, oriented to person, place, and time  Eyes - pupils equal and reactive, extraocular eye movements intact. ENT - bilateral external ears and nose normal. Normal lips  Neck - supple, no significant adenopathy, no thyromegaly or mass  Lymphatics - no palpable lymphadenopathy, no hepatosplenomegaly  Chest - clear to auscultation, no wheezes, rales or rhonchi, symmetric air entry  Heart - normal rate, regular rhythm, normal S1, S2, no murmurs, rubs, clicks or gallops  Extremities - peripheral pulses normal, no pedal edema, no clubbing or cyanosis. A/P:   HTN  Much better, almost to goal. Con 't losartan 50 BID, oretic, plan change to hyzaar 50/12.5 BID when current bottle runs out. con't lower dose toprol XL for now. HLD/CHD  Asyx, on pretty good regimen  Con't Crestor and BP mgmt. Labs in goal 7/16. Con't. Work on [employer] DM2  Weight stable. Con't metformin, try again to boost to 1500mg/d. Con't amaryl 1mg prn sugar >200. Smoking  Still smoking about a pack a day. Still hasn't tried chantix. Has box of pills at home. Still thinking about using it.  Brief intervention today again. Colon ca screen  Has Fit kit, will drop by sample when she can.     F/U 3mo/PRN

## 2017-12-01 ENCOUNTER — OFFICE VISIT (OUTPATIENT)
Dept: FAMILY MEDICINE CLINIC | Age: 67
End: 2017-12-01

## 2017-12-01 VITALS
OXYGEN SATURATION: 97 % | HEIGHT: 64 IN | BODY MASS INDEX: 27.31 KG/M2 | TEMPERATURE: 97.8 F | DIASTOLIC BLOOD PRESSURE: 70 MMHG | SYSTOLIC BLOOD PRESSURE: 129 MMHG | HEART RATE: 78 BPM | WEIGHT: 160 LBS

## 2017-12-01 DIAGNOSIS — I25.10 CORONARY ARTERY DISEASE INVOLVING NATIVE CORONARY ARTERY OF NATIVE HEART WITHOUT ANGINA PECTORIS: ICD-10-CM

## 2017-12-01 DIAGNOSIS — F17.200 SMOKING: ICD-10-CM

## 2017-12-01 DIAGNOSIS — E11.9 TYPE 2 DIABETES MELLITUS WITHOUT COMPLICATION, WITHOUT LONG-TERM CURRENT USE OF INSULIN (HCC): Primary | ICD-10-CM

## 2017-12-01 DIAGNOSIS — E78.00 PURE HYPERCHOLESTEROLEMIA: ICD-10-CM

## 2017-12-01 RX ORDER — GLIMEPIRIDE 1 MG/1
TABLET ORAL
Qty: 30 TAB | Refills: 11 | Status: SHIPPED | OUTPATIENT
Start: 2017-12-01 | End: 2019-02-27 | Stop reason: SDUPTHER

## 2017-12-01 NOTE — PATIENT INSTRUCTIONS
Keep working on quitting smoking. If you have any questions regarding SQMOShart, you may call [employer] support at [phone].

## 2017-12-01 NOTE — MR AVS SNAPSHOT
Visit Information Date & Time Provider Department Dept. Phone Encounter #  
 12/1/2017  8:10 AM Yocasta Fountain MD [facility] [id number] Follow-up Instructions Return in about 3 months (around 3/1/2018). Follow-up and Disposition History Upcoming Health Maintenance Date Due COLONOSCOPY 4/22/1966 BREAST CANCER SCRN MAMMOGRAM 2/28/2018 MEDICARE YEARLY EXAM 2/9/2018 HEMOGLOBIN A1C Q6M 2/10/2018 EYE EXAM RETINAL OR DILATED Q1 3/3/2018 MICROALBUMIN Q1 5/11/2018 FOOT EXAM Q1 8/10/2018 LIPID PANEL Q1 8/10/2018 GLAUCOMA SCREENING Q2Y 3/3/2019 DTaP/Tdap/Td series (2 - Td) 5/13/2024 Allergies as of 12/1/2017  Review Complete On: 12/1/2017 By: Yocasta Fountain MD  
  
 Severity Noted Reaction Type Reactions Sulfa (Sulfonamide Antibiotics)  09/14/2015    Hives Lipitor [Atorvastatin] Low 10/19/2015   Topical Rash Phototoxic reaction Current Immunizations  Reviewed on 11/16/2016 Name Date DTaP 5/13/2014 Influenza High Dose Vaccine PF 9/8/2017, 11/16/2016 Influenza Vaccine 10/19/2015, 10/8/2014 Pneumococcal Conjugate (PCV-13) 10/8/2014 Pneumococcal Polysaccharide (PPSV-23) 1/18/2016 Not reviewed this visit You Were Diagnosed With   
  
 Codes Comments Type 2 diabetes mellitus without complication, without long-term current use of insulin (HCC)    -  Primary ICD-10-CM: E11.9 ICD-9-CM: 250.00 Pure hypercholesterolemia     ICD-10-CM: E78.00 ICD-9-CM: 272.0 Coronary artery disease involving native coronary artery of native heart without angina pectoris     ICD-10-CM: I25.10 ICD-9-CM: 414.01 Smoking     ICD-10-CM: F17.200 ICD-9-CM: 305.1 Vitals BP Pulse Temp Height(growth percentile) Weight(growth percentile) SpO2  
 129/70 78 97.8 °F (36.6 °C) (Oral) 5' 3.5\" (1.613 m) 160 lb (72.6 kg) 97% BMI OB Status Smoking Status 27.9 kg/m2 Menopause Former Smoker BMI and BSA Data Body Mass Index Body Surface Area  
 27.9 kg/m 2 1.8 m 2 Preferred Pharmacy Pharmacy Name Phone [facility] [place] [facility] - [address] [phone] Your Updated Medication List  
  
   
This list is accurate as of: 12/1/17  8:58 AM.  Always use your most recent med list.  
  
  
  
  
 aspirin delayed-release 81 mg tablet Take  by mouth daily. Blood-Glucose Meter monitoring kit Use to check sugar daily as directed for type 2 diabetes, not on insulin Calcium Carbonate-Vit D3-Min 600 mg calcium- 400 unit Tab Take  by mouth. clopidogrel 75 mg Tab Commonly known as:  PLAVIX TAKE ONE TABLET BY MOUTH ONCE DAILY CRESTOR 20 mg tablet Generic drug:  rosuvastatin TAKE 1 TABLET BY MOUTH NIGHTLY -- INDICATIONS -- HEART  
  
 cyanocobalamin 1,000 mcg sublingual tablet Commonly known as:  VITAMIN B-12 Take 1,000 mcg by mouth daily. glimepiride 1 mg tablet Commonly known as:  AMARYL Daily as needed sugar >200  
  
 glucose blood VI test strips strip Commonly known as:  ONETOUCH ULTRA TEST  
USE 1 TEST STRIP EACH TIME TO TEST BLOOD SUGAR DAILY FOR DIABETES  
  
 isosorbide mononitrate ER 30 mg tablet Commonly known as:  IMDUR Take 1 Tab by mouth daily. Indications: heart  
  
 lancets 33 gauge Misc Commonly known as: One Touch Ger[place]ine [place] USE 1 LANCET EACH TIME TO TEST BLOOD SUGAR DAILY AS DIRECTED  
  
 losartan-hydroCHLOROthiazide 50-12.5 mg per tablet Commonly known as:  HYZAAR Take 1 Tab by mouth two (2) times a day. Indications: pressure and heart  
  
 metFORMIN  mg tablet Commonly known as:  GLUCOPHAGE XR  
TAKE 3 TABLETS BY MOUTH ONCE DAILY WITH  DINNER  FOR  TYPE  2  DIABETES  MELLITUS  
  
 metoprolol succinate 50 mg XL tablet Commonly known as:  TOPROL-XL Take 1 Tab by mouth daily. Indications: pressure and heart multivitamin tablet Commonly known as:  ONE A DAY Take 1 tablet by mouth daily. nitroglycerin 0.4 mg SL tablet Commonly known as:  NITROSTAT  
by SubLINGual route every five (5) minutes as needed for Chest Pain.  
  
 pantoprazole 40 mg tablet Commonly known as:  PROTONIX Take 1 Tab by mouth daily. Indications: prevent ulcer, stomach Prescriptions Sent to Pharmacy Refills  
 glimepiride (AMARYL) 1 mg tablet 11 Sig: Daily as needed sugar >200 Class: Normal  
 Pharmacy: [address] [place] [address] Ph #: [phone] We Performed the Following HEMOGLOBIN A1C WITH EAG [61855 CPT(R)]  DIABETES FOOT EXAM [HM7 Custom] LIPID PANEL [49620 CPT(R)] METABOLIC PANEL, COMPREHENSIVE [74112 CPT(R)] Follow-up Instructions Return in about 3 months (around 3/1/2018). Patient Instructions Keep working on quitting smoking. If you have any questions regarding [employer], you may call [employer] support at [phone]. Patient Instructions History Introducing [facility]! Dear Pamela Rowe: Thank you for requesting a [employer] account. Our records indicate that you have previously registered for a [employer] account but its currently inactive. Please call our [employer] support line at [phone]. Additional Information If you have questions, please visit the Frequently Asked Questions section of the [employer] website at [email or web address] Remember, AboutOne is NOT to be used for urgent needs. For medical emergencies, dial 911. Now available from your iPhone and Android! Please provide this summary of care documentation to your next provider. Your primary care clinician is listed as Horacio Jimenez. If you have any questions after today's visit, please call [phone].

## 2017-12-01 NOTE — PROGRESS NOTES
Long Gutierrez is a 69 y.o. female presenting for/with:    Diabetes (FU)    HPI:  Hypertension. Blood pressures have been improving. Management at last visit included con't current regimen. Current regimen: angiotensin II receptor antagonist, beta-blocker, thiazide, and nitrate. Symptoms include no symptoms. Patient denies chest pain, palpitations, dyspnea. Lab review:   Lab Results   Component Value Date/Time    Sodium 139 02/08/2017 08:18 AM    Potassium 5.0 02/08/2017 08:18 AM    Chloride 101 02/08/2017 08:18 AM    CO2 22 02/08/2017 08:18 AM    Glucose 139 02/08/2017 08:18 AM    BUN 16 02/08/2017 08:18 AM    Creatinine 0.82 02/08/2017 08:18 AM    BUN/Creatinine ratio 20 02/08/2017 08:18 AM    GFR est AA 85 02/08/2017 08:18 AM    GFR est non-AA 74 02/08/2017 08:18 AM    Calcium 9.3 02/08/2017 08:18 AM     Diabetes. Sugars controlled moderately, high 100's to low 200's lately. Hypoglycemia: none  Tolerating current treatment well  Current medications include metformin ER 1.5g/d. Lab Results   Component Value Date/Time    Hemoglobin A1c 9.4 08/10/2017 08:15 AM    Hemoglobin A1c 7.9 05/11/2017 08:06 AM    Hemoglobin A1c 7.5 02/08/2017 08:18 AM    Glucose 139 02/08/2017 08:18 AM    Glucose  07/29/2015 09:55 AM    Microalb/Creat ratio (ug/mg creat.) 59.0 10/17/2016 09:13 AM    Microalbumin/creat ratio (POC) <30 05/11/2017 08:41 AM    LDL, calculated 62 08/10/2017 08:15 AM    Creatinine 0.82 02/08/2017 08:18 AM     Lab Results   Component Value Date/Time    Microalb/Creat ratio (ug/mg creat.) 59.0 10/17/2016 09:13 AM     Last eye exam performed Summer 2016,  Dr. Kalie Jhaveri, no  Hasn't gotten back yet. Last foot exam performed 1/2016, warm, good capillary refill, normal DP and PT pulses and normal monofilament exam    PMH, SH, Medications/Allergies: reviewed, on chart. Still smoking 1 ppd.     ROS:  Constitutional: No fever, chills or weight loss  Respiratory: No cough, SOB   CV: No chest pain or Palpitations    Visit Vitals    /70    Pulse 78    Temp 97.8 °F (36.6 °C) (Oral)    Ht 5' 3.5\" (1.613 m)    Wt 160 lb (72.6 kg)    SpO2 97%    BMI 27.9 kg/m2     Wt Readings from Last 3 Encounters:   12/01/17 160 lb (72.6 kg)   09/08/17 165 lb (74.8 kg)   08/10/17 165 lb (74.8 kg)   -5#  BP Readings from Last 3 Encounters:   12/01/17 129/70   09/08/17 142/63   08/10/17 171/87     Physical Examination: General appearance - alert, well appearing, and in no distress  Mental status - alert, oriented to person, place, and time  Eyes - pupils equal and reactive, extraocular eye movements intact. ENT - bilateral external ears and nose normal. Normal lips  Neck - supple, no significant adenopathy, no thyromegaly or mass  Lymphatics - no palpable lymphadenopathy, no hepatosplenomegaly  Chest - clear to auscultation, no wheezes, rales or rhonchi, symmetric air entry  Heart - normal rate, regular rhythm, normal S1, S2, no murmurs, rubs, clicks or gallops  Extremities - peripheral pulses normal, no pedal edema, no clubbing or cyanosis. Foot check: No calluses, no tinea. DP, PT pulses 2+ bilat. Monofilament test normal bilat. A/P:   HTN  Much better, in goal. Con't current tx. HLD/CHD  Asyx, on pretty good regimen  Con't Crestor and BP mgmt. Labs in goal 7/16. Con't. Work on [employer] DM2  Weight improving. Con't metformin at 1500mg/d. See how she's doing. Ran out of amaryl. RF. Smoking  Still smoking about a pack a day. Still hasn't tried chantix. Has box of pills at home. Still thinking about using it. Brief intervention today again. Colon ca screen  Has Fit kit, will drop by sample when she can.      F/U 3mo/PRN

## 2017-12-02 LAB
ALBUMIN SERPL-MCNC: 4.4 G/DL (ref 3.6–4.8)
ALBUMIN/GLOB SERPL: 1.7 {RATIO} (ref 1.2–2.2)
ALP SERPL-CCNC: 72 IU/L (ref 39–117)
ALT SERPL-CCNC: 14 IU/L (ref 0–32)
AST SERPL-CCNC: 15 IU/L (ref 0–40)
BILIRUB SERPL-MCNC: 0.4 MG/DL (ref 0–1.2)
BUN SERPL-MCNC: 15 MG/DL (ref 8–27)
BUN/CREAT SERPL: 15 (ref 12–28)
CALCIUM SERPL-MCNC: 9.8 MG/DL (ref 8.7–10.3)
CHLORIDE SERPL-SCNC: 100 MMOL/L (ref 96–106)
CHOLEST SERPL-MCNC: 138 MG/DL (ref 100–199)
CO2 SERPL-SCNC: 23 MMOL/L (ref 18–29)
CREAT SERPL-MCNC: 0.98 MG/DL (ref 0.57–1)
EST. AVERAGE GLUCOSE BLD GHB EST-MCNC: 192 MG/DL
GFR SERPLBLD CREATININE-BSD FMLA CKD-EPI: 59 ML/MIN/1.73
GFR SERPLBLD CREATININE-BSD FMLA CKD-EPI: 68 ML/MIN/1.73
GLOBULIN SER CALC-MCNC: 2.6 G/DL (ref 1.5–4.5)
GLUCOSE SERPL-MCNC: 171 MG/DL (ref 65–99)
HBA1C MFR BLD: 8.3 % (ref 4.8–5.6)
HDLC SERPL-MCNC: 31 MG/DL
LDLC SERPL CALC-MCNC: 51 MG/DL (ref 0–99)
POTASSIUM SERPL-SCNC: 4.6 MMOL/L (ref 3.5–5.2)
PROT SERPL-MCNC: 7 G/DL (ref 6–8.5)
SODIUM SERPL-SCNC: 141 MMOL/L (ref 134–144)
TRIGL SERPL-MCNC: 278 MG/DL (ref 0–149)
VLDLC SERPL CALC-MCNC: 56 MG/DL (ref 5–40)

## 2018-02-28 ENCOUNTER — OFFICE VISIT (OUTPATIENT)
Dept: FAMILY MEDICINE CLINIC | Age: 68
End: 2018-02-28

## 2018-02-28 VITALS
HEART RATE: 65 BPM | HEIGHT: 64 IN | BODY MASS INDEX: 28.2 KG/M2 | WEIGHT: 165.2 LBS | OXYGEN SATURATION: 98 % | SYSTOLIC BLOOD PRESSURE: 140 MMHG | RESPIRATION RATE: 12 BRPM | TEMPERATURE: 98 F | DIASTOLIC BLOOD PRESSURE: 70 MMHG

## 2018-02-28 DIAGNOSIS — I10 ESSENTIAL HYPERTENSION WITH GOAL BLOOD PRESSURE LESS THAN 130/85: ICD-10-CM

## 2018-02-28 DIAGNOSIS — E11.9 TYPE 2 DIABETES MELLITUS WITHOUT COMPLICATION, WITHOUT LONG-TERM CURRENT USE OF INSULIN (HCC): ICD-10-CM

## 2018-02-28 DIAGNOSIS — E78.00 PURE HYPERCHOLESTEROLEMIA: ICD-10-CM

## 2018-02-28 DIAGNOSIS — Z13.31 SCREENING FOR DEPRESSION: ICD-10-CM

## 2018-02-28 DIAGNOSIS — Z12.11 SCREENING FOR COLON CANCER: ICD-10-CM

## 2018-02-28 DIAGNOSIS — K21.9 GASTROESOPHAGEAL REFLUX DISEASE WITHOUT ESOPHAGITIS: ICD-10-CM

## 2018-02-28 DIAGNOSIS — K05.6 CHRONIC PERIODONTAL DISEASE: ICD-10-CM

## 2018-02-28 DIAGNOSIS — Z13.39 SCREENING FOR ALCOHOLISM: ICD-10-CM

## 2018-02-28 DIAGNOSIS — I25.83 CORONARY ARTERY DISEASE DUE TO LIPID RICH PLAQUE: ICD-10-CM

## 2018-02-28 DIAGNOSIS — Z00.00 MEDICARE ANNUAL WELLNESS VISIT, SUBSEQUENT: Primary | ICD-10-CM

## 2018-02-28 DIAGNOSIS — I25.10 CORONARY ARTERY DISEASE INVOLVING NATIVE CORONARY ARTERY OF NATIVE HEART WITHOUT ANGINA PECTORIS: ICD-10-CM

## 2018-02-28 DIAGNOSIS — I25.10 CORONARY ARTERY DISEASE DUE TO LIPID RICH PLAQUE: ICD-10-CM

## 2018-02-28 DIAGNOSIS — F17.200 SMOKING: ICD-10-CM

## 2018-02-28 RX ORDER — METOPROLOL SUCCINATE 50 MG/1
50 TABLET, EXTENDED RELEASE ORAL DAILY
Qty: 90 TAB | Refills: 3 | Status: SHIPPED | OUTPATIENT
Start: 2018-02-28 | End: 2019-02-27 | Stop reason: SDUPTHER

## 2018-02-28 RX ORDER — METFORMIN HYDROCHLORIDE 500 MG/1
TABLET, EXTENDED RELEASE ORAL
Qty: 270 TAB | Refills: 3 | Status: SHIPPED | OUTPATIENT
Start: 2018-02-28 | End: 2018-05-31 | Stop reason: SDUPTHER

## 2018-02-28 RX ORDER — ROSUVASTATIN CALCIUM 20 MG/1
TABLET, COATED ORAL
Qty: 90 TAB | Refills: 3 | Status: SHIPPED | OUTPATIENT
Start: 2018-02-28 | End: 2019-02-27 | Stop reason: SDUPTHER

## 2018-02-28 RX ORDER — PANTOPRAZOLE SODIUM 40 MG/1
40 TABLET, DELAYED RELEASE ORAL DAILY
Qty: 90 TAB | Refills: 3 | Status: SHIPPED | OUTPATIENT
Start: 2018-02-28 | End: 2019-02-27 | Stop reason: SDUPTHER

## 2018-02-28 RX ORDER — CLOPIDOGREL BISULFATE 75 MG/1
TABLET ORAL
Qty: 90 TAB | Refills: 3 | Status: SHIPPED | OUTPATIENT
Start: 2018-02-28 | End: 2019-02-27 | Stop reason: SDUPTHER

## 2018-02-28 RX ORDER — ISOSORBIDE MONONITRATE 30 MG/1
30 TABLET, EXTENDED RELEASE ORAL DAILY
Qty: 90 TAB | Refills: 3 | Status: SHIPPED | OUTPATIENT
Start: 2018-02-28 | End: 2019-02-27 | Stop reason: SDUPTHER

## 2018-02-28 RX ORDER — ASPIRIN 81 MG/1
81 TABLET ORAL DAILY
Qty: 100 TAB | Refills: 3
Start: 2018-02-28 | End: 2019-02-27 | Stop reason: SDUPTHER

## 2018-02-28 RX ORDER — LANCETS 33 GAUGE
EACH MISCELLANEOUS
Qty: 100 LANCET | Refills: 3 | Status: SHIPPED | OUTPATIENT
Start: 2018-02-28 | End: 2019-04-30 | Stop reason: SDUPTHER

## 2018-02-28 RX ORDER — PENICILLIN V POTASSIUM 250 MG/1
500 TABLET, FILM COATED ORAL 2 TIMES DAILY
Qty: 20 TAB | Refills: 1 | Status: SHIPPED | OUTPATIENT
Start: 2018-02-28 | End: 2018-05-30

## 2018-02-28 NOTE — PROGRESS NOTES
Trinity France is a 69 y.o. female presenting for/with: Annual Wellness Visit; Diabetes; and Gum Problem    HPI:  Hypertension. Blood pressures have been so/so today, but not as good as last visit. Management at last visit included con't current regimen. Current regimen: angiotensin II receptor antagonist, beta-blocker, thiazide, and nitrate. Symptoms include no symptoms. Patient denies chest pain, palpitations, dyspnea. Lab review:   Lab Results   Component Value Date/Time    Sodium 141 12/01/2017 08:58 AM    Potassium 4.6 12/01/2017 08:58 AM    Chloride 100 12/01/2017 08:58 AM    CO2 23 12/01/2017 08:58 AM    Glucose 171 (H) 12/01/2017 08:58 AM    BUN 15 12/01/2017 08:58 AM    Creatinine 0.98 12/01/2017 08:58 AM    BUN/Creatinine ratio 15 12/01/2017 08:58 AM    GFR est AA 68 12/01/2017 08:58 AM    GFR est non-AA 59 (L) 12/01/2017 08:58 AM    Calcium 9.8 12/01/2017 08:58 AM     Diabetes. Sugars controlled moderately, high 100's to low 200's lately. Hypoglycemia: none  Tolerating current treatment well  Current medications include metformin ER 1.5g/d and amaryl 1mg every day prn sugar >200    Lab Results   Component Value Date/Time    Hemoglobin A1c 8.3 (H) 12/01/2017 08:58 AM    Hemoglobin A1c 9.4 (H) 08/10/2017 08:15 AM    Hemoglobin A1c 7.9 (H) 05/11/2017 08:06 AM    Glucose 171 (H) 12/01/2017 08:58 AM    Glucose  (A) 07/29/2015 09:55 AM    Microalb/Creat ratio (ug/mg creat.) 59.0 (H) 10/17/2016 09:13 AM    Microalbumin/creat ratio (POC) <30 05/11/2017 08:41 AM    LDL, calculated 51 12/01/2017 08:58 AM    Creatinine 0.98 12/01/2017 08:58 AM     Last eye exam performed Summer 2016,  Dr. Aminta Alvarado, no  Has appt for next mo with them. Last foot exam performed 12/2017, warm, good capillary refill, normal DP and PT pulses and normal monofilament exam    PMH, SH, Medications/Allergies: reviewed, on chart. Still smoking 1 ppd.     ROS:  Constitutional: No fever, chills or weight loss  Respiratory: No cough, SOB   CV: No chest pain or Palpitations    Visit Vitals    /70 (BP 1 Location: Right arm, BP Patient Position: Sitting)    Pulse 65    Temp 98 °F (36.7 °C) (Oral)    Resp 12    Ht 5' 3.5\" (1.613 m)    Wt 165 lb 3.2 oz (74.9 kg)    SpO2 98%    BMI 28.8 kg/m2     Wt Readings from Last 3 Encounters:   02/28/18 165 lb 3.2 oz (74.9 kg)   12/01/17 160 lb (72.6 kg)   09/08/17 165 lb (74.8 kg)   +5#  BP Readings from Last 3 Encounters:   02/28/18 140/70   12/01/17 129/70   09/08/17 142/63     Physical Examination: General appearance - alert, well appearing, and in no distress  Mental status - alert, oriented to person, place, and time  Eyes - pupils equal and reactive, extraocular eye movements intact. ENT - bilateral external ears and nose normal. Normal lips. OP with mod gum recession to mult lower jaw incisiors with erythema and ttp. Neck - supple, no significant adenopathy, no thyromegaly or mass  Lymphatics - no palpable lymphadenopathy, no hepatosplenomegaly  Chest - clear to auscultation, no wheezes, rales or rhonchi, symmetric air entry  Heart - normal rate, regular rhythm, normal S1, S2, no murmurs, rubs, clicks or gallops  Extremities - peripheral pulses normal, no pedal edema, no clubbing or cyanosis. A/P:   HTN  Much better, in goal. Con't current tx. HLD/CHD  Asyx, on pretty good regimen  Con't Crestor and BP mgmt. Labs in goal 7/16. Con't. Work on RF mod    DM2  Weight worsening. Con't metformin at 1500mg/d and amaryl. Check A1c, if not in goal again, plan boost metformin ER 500mg to 4tabs/day. Consider acarbose or GLP-1 like bydureon. Plan bill/cr spring. Smoking  Still smoking about a pack a day. Still thinking about using it. Brief intervention today again. Periodontal dz  Con't to f/u with Dental [facility] Tien [facility] in [facility]. Tx with Pen VK 1g BID. Colon ca screen  Has Fit kit, will drop by sample when she can.     F/U 3mo/PRN    ______________________________________________________________________    Madelyn Menendez is a 69 y.o. female and presents for annual Medicare Wellness Visit. Problem List: Reviewed with patient and discussed risk factors. Patient Active Problem List   Diagnosis Code    HTN (hypertension) I5    H/O total hip arthroplasty Z96.649    GERD (gastroesophageal reflux disease) K21.9    OA (osteoarthritis) M19.90    Hyperlipidemia E78.5    CHD (coronary heart disease) I25.10    Right leg DVT (HCC) I82.401    DM[facility] ([facility], type 2) ([facility] E11.9    Advance directive discussed with patient Z71.89       Current medical providers:  Patient Care Team:  Margot Abbott MD as PCP - General (Family Practice)    PM, , Medications/Allergies: reviewed, on chart. Female Alcohol Screening: On any occasion during the past 3 months, have you had more than 3 drinks containing alcohol? No    Do you average more than 7 drinks per week? No  ROS:  Constitutional: No fever, chills or weight loss  Respiratory: No cough, SOB   CV: No chest pain or Palpitations    Objective:  Visit Vitals    /70 (BP 1 Location: Right arm, BP Patient Position: Sitting)    Pulse 65    Temp 98 °F (36.7 °C) (Oral)    Resp 12    Ht 5' 3.5\" (1.613 m)    Wt 165 lb 3.2 oz (74.9 kg)    SpO2 98%    BMI 28.8 kg/m2    Body mass index is 28.8 kg/(m^2). Assessment of cognitive impairment: Alert and oriented x 3    Depression Screen:   PHQ over the last two weeks 2/28/2018   PHQ Not Done -   Little interest or pleasure in doing things Not at all   Feeling down, depressed or hopeless Not at all   Total Score PHQ 2 0       Fall Risk Assessment:    Fall Risk Assessment, last 12 mths 2/28/2018   Able to walk? Yes   Fall in past 12 months? No   Number of falls in past 12 months -       Functional Ability:   Does the patient exhibit a steady gait?   yes   How long did it take the patient to get up and walk from a sitting position? 2s   Is the patient self reliant?  (ie can do own laundry, meals, household chores)  yes     Does the patient handle his/her own medications? yes     Does the patient handle his/her own money? yes     Is the patients home safe (ie good lighting, handrails on stairs and bath, etc.)? YES     Did you notice or did patient express any hearing difficulties? no     Did you notice or did patient express any vision difficulties? no       Advance Care Planning:   Patient was offered the opportunity to discuss advance care planning:  yes     Does patient have an Advance Directive:  yes   If no, did you provide information on Caring Connections? NA     Plan:      Orders Placed This Encounter    OTHER,NON-FORMULARY,    penicillin v potassium (VEETID) 250 mg tablet    metFORMIN ER (GLUCOPHAGE XR) 500 mg tablet    rosuvastatin (CRESTOR) 20 mg tablet    clopidogrel (PLAVIX) 75 mg tab    glucose blood VI test strips (ONETOUCH ULTRA TEST) strip    lancets (ONE TOUCH DELICA) 33 gauge misc    metoprolol succinate (TOPROL-XL) 50 mg XL tablet    isosorbide mononitrate ER (IMDUR) 30 mg tablet    pantoprazole (PROTONIX) 40 mg tablet    aspirin delayed-release 81 mg tablet       Health Maintenance   Topic Date Due    COLONOSCOPY  04/22/1968    MEDICARE YEARLY EXAM  02/09/2018    BREAST CANCER SCRN MAMMOGRAM  02/28/2018    EYE EXAM RETINAL OR DILATED Q1  03/03/2018    MICROALBUMIN Q1  05/11/2018    HEMOGLOBIN A1C Q6M  06/01/2018    FOOT EXAM Q1  12/01/2018    LIPID PANEL Q1  12/01/2018    GLAUCOMA SCREENING Q2Y  03/03/2019    DTaP/Tdap/Td series (2 - Tdap) 05/13/2024    Hepatitis C Screening  Completed    OSTEOPOROSIS SCREENING (DEXA)  Completed    ZOSTER VACCINE AGE 60>  Addressed    Pneumococcal 65+ Low/Medium Risk  Completed    Influenza Age 5 to Adult  Completed     *Patient verbalized understanding and agreement with the plan.   A copy of the After Visit Summary with Dsg.nr plan was given to the patient today.

## 2018-02-28 NOTE — MR AVS SNAPSHOT
303 [facility] 
 
 
 [address] [address] [phone] Patient: Lidia Hernandez MRN: [id number] VNU:4/54/0747 Visit Information Date & Time Provider Department Dept. Phone Encounter #  
 2/28/2018  8:10 AM José Dos Santos MD [address] [place], [place] [address]zel Manjinder [id number] Follow-up Instructions Return in about 3 months (around 5/28/2018). Follow-up and Disposition History Your Appointments 5/30/2018  7:30 AM  
ESTABLISHED PATIENT with MD Adolfo Hua (Carmen Kush) Appt Note: 3 MO F/U  
 [address] [address] [phone]  
  
   
 [address] [place] [address],[address] [address] Upcoming Health Maintenance Date Due FOBT Q 1 YEAR AGE 50-75 4/22/1998 MEDICARE YEARLY EXAM 2/9/2018 BREAST CANCER SCRN MAMMOGRAM 2/28/2018 EYE EXAM RETINAL OR DILATED Q1 3/3/2018 MICROALBUMIN Q1 5/11/2018 HEMOGLOBIN A1C Q6M 6/1/2018 FOOT EXAM Q1 12/1/2018 LIPID PANEL Q1 12/1/2018 GLAUCOMA SCREENING Q2Y 3/3/2019 DTaP/Tdap/Td series (2 - Tdap) 5/13/2024 Allergies as of 2/28/2018  Review Complete On: 2/28/2018 By: José Dos Santos MD  
  
 Severity Noted Reaction Type Reactions Sulfa (Sulfonamide Antibiotics)  09/14/2015    Hives Lipitor [Atorvastatin] Low 10/19/2015   Topical Rash Phototoxic reaction Current Immunizations  Reviewed on 11/16/2016 Name Date DTaP 5/13/2014 Influenza High Dose Vaccine PF 9/8/2017, 11/16/2016 Influenza Vaccine 10/19/2015, 10/8/2014 Pneumococcal Conjugate (PCV-13) 10/8/2014 Pneumococcal Polysaccharide (PPSV-23) 1/18/2016 Not reviewed this visit You Were Diagnosed With   
  
 Codes Comments Chronic periodontal disease    -  Primary ICD-10-CM: B93.3 ICD-9-CM: 523.9 Smoking     ICD-10-CM: F17.200 ICD-9-CM: 305.1 Type 2 diabetes mellitus without complication, without long-term current use of insulin (HCC)     ICD-10-CM: E11.9 ICD-9-CM: 250.00 Coronary artery disease due to lipid rich plaque     ICD-10-CM: I25.10, I25.83 ICD-9-CM: 414.00, 414.3 Pure hypercholesterolemia     ICD-10-CM: E78.00 ICD-9-CM: 272.0 Coronary artery disease involving native coronary artery of native heart without angina pectoris     ICD-10-CM: I25.10 ICD-9-CM: 414.01 Essential hypertension with goal blood pressure less than 130/85     ICD-10-CM: I10 
ICD-9-CM: 401.9 Gastroesophageal reflux disease without esophagitis     ICD-10-CM: K21.9 ICD-9-CM: 530.81 Vitals BP Pulse Temp Resp Height(growth percentile) Weight(growth percentile) 140/70 (BP 1 Location: Right arm, BP Patient Position: Sitting) 65 98 °F (36.7 °C) (Oral) 12 5' 3.5\" (1.613 m) 165 lb 3.2 oz (74.9 kg) SpO2 BMI OB Status Smoking Status 98% 28.8 kg/m2 Menopause Former Smoker BMI and BSA Data Body Mass Index Body Surface Area  
 28.8 kg/m 2 1.83 m 2 Preferred Pharmacy Pharmacy Name Phone [address] [phone] Your Updated Medication List  
  
   
This list is accurate as of 2/28/18  8:38 AM.  Always use your most recent med list.  
  
  
  
  
 aspirin delayed-release 81 mg tablet Take 1 Tab by mouth daily. Blood-Glucose Meter monitoring kit Use to check sugar daily as directed for type 2 diabetes, not on insulin Calcium Carbonate-Vit D3-Min 600 mg calcium- 400 unit Tab Take  by mouth. clopidogrel 75 mg Tab Commonly known as:  PLAVIX TAKE ONE TABLET BY MOUTH ONCE DAILY for heart  
  
 cyanocobalamin 1,000 mcg sublingual tablet Commonly known as:  VITAMIN B-12 Take 1,000 mcg by mouth daily. glimepiride 1 mg tablet Commonly known as:  AMARYL Daily as needed sugar >200  
  
 glucose blood VI test strips strip Commonly known as:  ONETOUCH ULTRA TEST  
USE 1 TEST STRIP EACH TIME TO TEST BLOOD SUGAR DAILY FOR DIABETES  
  
 isosorbide mononitrate ER 30 mg tablet Commonly known as:  IMDUR Take 1 Tab by mouth daily. Indications: heart  
  
 lancets 33 gauge Misc Commonly known as: One Touch Angelita Mosher USE 1 LANCET EACH TIME TO TEST BLOOD SUGAR DAILY AS DIRECTED  
  
 losartan-hydroCHLOROthiazide 50-12.5 mg per tablet Commonly known as:  HYZAAR Take 1 Tab by mouth two (2) times a day. Indications: pressure and heart  
  
 metFORMIN  mg tablet Commonly known as:  GLUCOPHAGE XR  
TAKE 3 TABLETS BY MOUTH ONCE DAILY WITH  DINNER  FOR  TYPE  2  DIABETES  MELLITUS  
  
 metoprolol succinate 50 mg XL tablet Commonly known as:  TOPROL-XL Take 1 Tab by mouth daily. Indications: pressure and heart  
  
 multivitamin tablet Commonly known as:  ONE A DAY Take 1 tablet by mouth daily. nitroglycerin 0.4 mg SL tablet Commonly known as:  NITROSTAT  
by SubLINGual route every five (5) minutes as needed for Chest Pain. OTHER(NON-FORMULARY) Indications: theives  
  
 pantoprazole 40 mg tablet Commonly known as:  PROTONIX Take 1 Tab by mouth daily. Indications: prevent ulcer, stomach  
  
 penicillin v potassium 250 mg tablet Commonly known as:  VEETID Take 2 Tabs by mouth two (2) times a day. Indications: teeth  
  
 rosuvastatin 20 mg tablet Commonly known as:  CRESTOR  
TAKE 1 TABLET BY MOUTH NIGHTLY -- INDICATIONS -- HEART Prescriptions Sent to Pharmacy Refills  
 penicillin v potassium (VEETID) 250 mg tablet 1 Sig: Take 2 Tabs by mouth two (2) times a day. Indications: teeth Class: Normal  
 Pharmacy: [facility] [address], [address] [address] [address] Ph #: [phone] Route: Oral  
 metFORMIN ER (GLUCOPHAGE XR) 500 mg tablet 3 Sig: TAKE 3 TABLETS BY MOUTH ONCE DAILY WITH  DINNER  FOR  TYPE  2  DIABETES  MELLITUS  Class: Normal  
 Pharmacy: [address] [place] [address] [address] Ph #: [phone]  
 rosuvastatin (CRESTOR) 20 mg tablet 3 Sig: TAKE 1 TABLET BY MOUTH NIGHTLY -- INDICATIONS -- HEART Class: Normal  
 Pharmacy: [facility] [address] [place] [address] - [facility] [address] Ph #: [phone]  
 clopidogrel (PLAVIX) 75 mg tab 3 Sig: TAKE ONE TABLET BY MOUTH ONCE DAILY for heart Class: Normal  
 Pharmacy: [facility] [address] [place] [address] [address] Ph #: [phone]  
 glucose blood VI test strips (ONETOUCH ULTRA TEST) strip 3 Sig: USE 1 TEST STRIP EACH TIME TO TEST BLOOD SUGAR DAILY FOR DIABETES Class: Normal  
 Pharmacy: [facility] [address], [address] [facility] Ph #: [phone]  
 lancets (ONE TOUCH DELICA) 33 gauge misc 3 Sig: USE 1 LANCET EACH TIME TO TEST BLOOD SUGAR DAILY AS DIRECTED Class: Normal  
 Pharmacy: [facility] [address] [place] [address] [address] Ph #: [phone]  
 metoprolol succinate (TOPROL-XL) 50 mg XL tablet 3 Sig: Take 1 Tab by mouth daily. Indications: pressure and heart Class: Normal  
 Pharmacy: [facility] [address], [address] Ph #: [phone] Route: Oral  
 isosorbide mononitrate ER (IMDUR) 30 mg tablet 3 Sig: Take 1 Tab by mouth daily. Indications: heart Class: Normal  
 Pharmacy: [facility] [address], [address] Ph #: [phone] Route: Oral  
 pantoprazole (PROTONIX) 40 mg tablet 3 Sig: Take 1 Tab by mouth daily. Indications: prevent ulcer, stomach Class: Normal  
 Pharmacy: [facility] [address], [address] Ph #: [phone] Route: Oral  
  
We Performed the Following COLLECTION VENOUS BLOOD,VENIPUNCTURE [id number] CPT(R)] HEMOGLOBIN A1C WITH EAG [59309 CPT(R)] LIPID PANEL [67811 CPT(R)] METABOLIC PANEL, COMPREHENSIVE [67948 CPT(R)] Follow-up Instructions Return in about 3 months (around 5/28/2018). Introducing [facility] & HEALTH SERVICES! Dear Cheryle Shear: Thank you for requesting a Mobile Backstage account. Our records indicate that you have previously registered for a Mobile Backstage account but its currently inactive. Please call our Mobile Backstage support line at [phone]. Additional Information If you have questions, please visit the Frequently Asked Questions section of the Mobile Backstage website at [email or web address] Remember, Mobile Backstage is NOT to be used for urgent needs. For medical emergencies, dial 911. Now available from your iPhone and Android! Please provide this summary of care documentation to your next provider. Your primary care clinician is listed as Wiley Jimenez. If you have any questions after today's visit, please call [phone].

## 2018-03-01 LAB
ALBUMIN SERPL-MCNC: 4.3 G/DL (ref 3.6–4.8)
ALBUMIN/GLOB SERPL: 1.7 {RATIO} (ref 1.2–2.2)
ALP SERPL-CCNC: 68 IU/L (ref 39–117)
ALT SERPL-CCNC: 19 IU/L (ref 0–32)
AST SERPL-CCNC: 16 IU/L (ref 0–40)
BILIRUB SERPL-MCNC: 0.4 MG/DL (ref 0–1.2)
BUN SERPL-MCNC: 19 MG/DL (ref 8–27)
BUN/CREAT SERPL: 22 (ref 12–28)
CALCIUM SERPL-MCNC: 8.9 MG/DL (ref 8.7–10.3)
CHLORIDE SERPL-SCNC: 98 MMOL/L (ref 96–106)
CHOLEST SERPL-MCNC: 138 MG/DL (ref 100–199)
CO2 SERPL-SCNC: 25 MMOL/L (ref 18–29)
CREAT SERPL-MCNC: 0.85 MG/DL (ref 0.57–1)
EST. AVERAGE GLUCOSE BLD GHB EST-MCNC: 180 MG/DL
GFR SERPLBLD CREATININE-BSD FMLA CKD-EPI: 70 ML/MIN/1.73
GFR SERPLBLD CREATININE-BSD FMLA CKD-EPI: 81 ML/MIN/1.73
GLOBULIN SER CALC-MCNC: 2.6 G/DL (ref 1.5–4.5)
GLUCOSE SERPL-MCNC: 144 MG/DL (ref 65–99)
HBA1C MFR BLD: 7.9 % (ref 4.8–5.6)
HDLC SERPL-MCNC: 32 MG/DL
LDLC SERPL CALC-MCNC: 58 MG/DL (ref 0–99)
POTASSIUM SERPL-SCNC: 4.4 MMOL/L (ref 3.5–5.2)
PROT SERPL-MCNC: 6.9 G/DL (ref 6–8.5)
SODIUM SERPL-SCNC: 141 MMOL/L (ref 134–144)
TRIGL SERPL-MCNC: 242 MG/DL (ref 0–149)
VLDLC SERPL CALC-MCNC: 48 MG/DL (ref 5–40)

## 2018-03-13 LAB — HEMOCCULT STL QL IA: NEGATIVE

## 2018-05-30 ENCOUNTER — OFFICE VISIT (OUTPATIENT)
Dept: FAMILY MEDICINE CLINIC | Age: 68
End: 2018-05-30

## 2018-05-30 VITALS
HEIGHT: 64 IN | SYSTOLIC BLOOD PRESSURE: 100 MMHG | RESPIRATION RATE: 14 BRPM | TEMPERATURE: 98.7 F | DIASTOLIC BLOOD PRESSURE: 60 MMHG | WEIGHT: 164.6 LBS | OXYGEN SATURATION: 98 % | HEART RATE: 68 BPM | BODY MASS INDEX: 28.1 KG/M2

## 2018-05-30 DIAGNOSIS — I25.10 CORONARY ARTERY DISEASE INVOLVING NATIVE CORONARY ARTERY OF NATIVE HEART WITHOUT ANGINA PECTORIS: ICD-10-CM

## 2018-05-30 DIAGNOSIS — Z13.39 SCREENING FOR ALCOHOLISM: ICD-10-CM

## 2018-05-30 DIAGNOSIS — Z13.31 SCREENING FOR DEPRESSION: ICD-10-CM

## 2018-05-30 DIAGNOSIS — E11.9 TYPE 2 DIABETES MELLITUS WITHOUT COMPLICATION, WITHOUT LONG-TERM CURRENT USE OF INSULIN (HCC): ICD-10-CM

## 2018-05-30 DIAGNOSIS — L57.8 SOLAR DERMATITIS: ICD-10-CM

## 2018-05-30 DIAGNOSIS — I10 ESSENTIAL HYPERTENSION: ICD-10-CM

## 2018-05-30 DIAGNOSIS — E78.00 PURE HYPERCHOLESTEROLEMIA: ICD-10-CM

## 2018-05-30 DIAGNOSIS — Z86.718 HISTORY OF DVT OF LOWER EXTREMITY: ICD-10-CM

## 2018-05-30 DIAGNOSIS — Z00.00 MEDICARE ANNUAL WELLNESS VISIT, SUBSEQUENT: Primary | ICD-10-CM

## 2018-05-30 DIAGNOSIS — Z12.39 SCREENING FOR BREAST CANCER: ICD-10-CM

## 2018-05-30 LAB
ALBUMIN UR QL STRIP: 30 MG/L
CREATININE, URINE POC: 300 MG/DL
MICROALBUMIN/CREAT RATIO POC: <30 MG/G

## 2018-05-30 RX ORDER — MOMETASONE FUROATE 1 MG/G
CREAM TOPICAL
Qty: 45 G | Refills: 3 | Status: SHIPPED | OUTPATIENT
Start: 2018-05-30 | End: 2019-04-05

## 2018-05-30 NOTE — PROGRESS NOTES
Charles Galvan is a 77 y.o. female presenting for/with: Annual Wellness Visit and Rash    HPI:  Hypertension. Blood pressures have been great. Management at last visit included con't current regimen. Current regimen: angiotensin II receptor antagonist, beta-blocker, thiazide, and nitrate. Symptoms include no symptoms. Patient denies chest pain, palpitations, dyspnea. Pt does have an erythematous rash to sun-exposed skin of the forearms and upper chest where her collar is open, has been doing some work in the yard and has had some sun exposure. Lab review:   Lab Results   Component Value Date/Time    Sodium 141 02/28/2018 08:36 AM    Potassium 4.4 02/28/2018 08:36 AM    Chloride 98 02/28/2018 08:36 AM    CO2 25 02/28/2018 08:36 AM    Glucose 144 (H) 02/28/2018 08:36 AM    BUN 19 02/28/2018 08:36 AM    Creatinine 0.85 02/28/2018 08:36 AM    BUN/Creatinine ratio 22 02/28/2018 08:36 AM    GFR est AA 81 02/28/2018 08:36 AM    GFR est non-AA 70 02/28/2018 08:36 AM    Calcium 8.9 02/28/2018 08:36 AM     Diabetes. Sugars controlled moderately, low to mid 100's. Hypoglycemia: none  Tolerating current treatment well  Current medications include metformin ER 1.5g/d and amaryl 1mg every day prn sugar >200. Lab Results   Component Value Date/Time    Hemoglobin A1c 7.9 (H) 02/28/2018 08:36 AM    Hemoglobin A1c 8.3 (H) 12/01/2017 08:58 AM    Hemoglobin A1c 9.4 (H) 08/10/2017 08:15 AM    Glucose 144 (H) 02/28/2018 08:36 AM    Glucose  (A) 07/29/2015 09:55 AM    Microalb/Creat ratio (ug/mg creat.) 59.0 (H) 10/17/2016 09:13 AM    Microalbumin/creat ratio (POC) <30 05/11/2017 08:41 AM    LDL, calculated 58 02/28/2018 08:36 AM    Creatinine 0.85 02/28/2018 08:36 AM     Last eye exam performed April 2018,  Dr. Ryan Habermann, lexx DR. Last foot exam performed 12/2017, warm, good capillary refill, normal DP and PT pulses and normal monofilament exam    Hyperlipidemia. On crestor 20. Imani well.  No myalgias, arthralgias, unusual weakness. Lab Results   Component Value Date/Time    Cholesterol, total 138 02/28/2018 08:36 AM    HDL Cholesterol 32 (L) 02/28/2018 08:36 AM    LDL, calculated 58 02/28/2018 08:36 AM    VLDL, calculated 48 (H) 02/28/2018 08:36 AM    Triglyceride 242 (H) 02/28/2018 08:36 AM       Lab Results   Component Value Date/Time    ALT (SGPT) 19 02/28/2018 08:36 AM    AST (SGOT) 16 02/28/2018 08:36 AM    Alk. phosphatase 68 02/28/2018 08:36 AM    Bilirubin, direct 0.10 04/18/2016 08:36 AM    Bilirubin, total 0.4 02/28/2018 08:36 AM     PMH, SH, Medications/Allergies: reviewed, on chart. Still smoking 1 ppd. ROS:  Constitutional: No fever, chills or weight loss  Respiratory: No cough, SOB   CV: No chest pain or Palpitations    Visit Vitals    /60 (BP 1 Location: Left arm, BP Patient Position: Sitting)    Pulse 68    Temp 98.7 °F (37.1 °C) (Temporal)    Resp 14    Ht 5' 3.5\" (1.613 m)    Wt 164 lb 9.6 oz (74.7 kg)    SpO2 98%    BMI 28.7 kg/m2     Wt Readings from Last 3 Encounters:   05/30/18 164 lb 9.6 oz (74.7 kg)   02/28/18 165 lb 3.2 oz (74.9 kg)   12/01/17 160 lb (72.6 kg)   -1#  BP Readings from Last 3 Encounters:   05/30/18 100/60   02/28/18 140/70   12/01/17 129/70     Physical Examination: General appearance - alert, well appearing, and in no distress  Mental status - alert, oriented to person, place, and time  Eyes - pupils equal and reactive, extraocular eye movements intact. ENT - bilateral external ears and nose normal. Normal lips. OP with mod gum recession to mult lower jaw incisiors with erythema and ttp. Neck - supple, no significant adenopathy, no thyromegaly or mass  Lymphatics - no palpable lymphadenopathy, no hepatosplenomegaly  Chest - clear to auscultation, no wheezes, rales or rhonchi, symmetric air entry  Heart - normal rate, regular rhythm, normal S1, S2, no murmurs, rubs, clicks or gallops  Extremities - peripheral pulses normal, no pedal edema, no clubbing or cyanosis.     A/P: HTN  In goal. Con't current tx. HLD/CHD  Remains asyx, on good regimen  Con't Crestor and BP mgmt. Labs in goal 7/16. Work on RF mod    DM2  Weight improving now. Con't metformin at 1500mg/d and amaryl. Recheck A1c, if not in goal again, plan boost metformin ER 500mg to 4tabs/day. Consider acarbose or GLP-1 like bydureon. Check bill/cr spring. Smoking  Still smoking about a pack a day. Still thinking about using it. Brief intervention today again. F/U 3mo/PRN    1. Have you been to the ER, urgent care clinic since your last visit? Hospitalized since your last visit? No    2. Have you seen or consulted any other health care providers outside of the [address] [place] [address] since your last visit? Include any pap smears or colon screening.  No

## 2018-05-30 NOTE — PATIENT INSTRUCTIONS

## 2018-05-30 NOTE — MR AVS SNAPSHOT
[address] [address] [phone] Patient: Ruth Ann Ferrera MRN: [id number] [facility]:9[birth year] Visit Information Date & Time Provider Department Dept. Phone Encounter #  
 5/30/2018  7:30 AM Lupe Asif MD [address] [id number] Follow-up Instructions Return in about 3 months (around 8/30/2018). Follow-up and Disposition History Upcoming Health Maintenance Date Due  
 BREAST CANCER SCRN MAMMOGRAM 2/28/2018 MEDICARE YEARLY EXAM 3/14/2018 MICROALBUMIN Q1 5/11/2018 Influenza Age 5 to Adult 8/1/2018 HEMOGLOBIN A1C Q6M 8/28/2018 FOOT EXAM Q1 12/1/2018 LIPID PANEL Q1 2/28/2019 EYE EXAM RETINAL OR DILATED Q1 3/8/2019 FOBT Q 1 YEAR AGE 50-75 3/12/2019 GLAUCOMA SCREENING Q2Y 3/8/2020 DTaP/Tdap/Td series (2 - Tdap) 5/13/2024 Allergies as of 5/30/2018  Review Complete On: 5/30/2018 By: Lupe Asif MD  
  
 Severity Noted Reaction Type Reactions Sulfa (Sulfonamide Antibiotics)  09/14/2015    Hives Lipitor [Atorvastatin] Low 10/19/2015   Topical Rash Phototoxic reaction Current Immunizations  Reviewed on 11/16/2016 Name Date DTaP 5/13/2014 Influenza High Dose Vaccine PF 9/8/2017, 11/16/2016 Influenza Vaccine 10/19/2015, 10/8/2014 Pneumococcal Conjugate (PCV-13) 10/8/2014 Pneumococcal Polysaccharide (PPSV-23) 1/18/2016 Not reviewed this visit You Were Diagnosed With   
  
 Codes Comments Medicare annual wellness visit, subsequent    -  Primary ICD-10-CM: Z00.00 ICD-9-CM: V70.0 Solar dermatitis     ICD-10-CM: L57.8 ICD-9-CM: 692.70 Coronary artery disease involving native coronary artery of native heart without angina pectoris     ICD-10-CM: I25.10 ICD-9-CM: 414.01 Essential hypertension     ICD-10-CM: I10 
ICD-9-CM: 401.9 Pure hypercholesterolemia     ICD-10-CM: E78.00 ICD-9-CM: 272.0 Type 2 diabetes mellitus without complication, without long-term current use of insulin (HCC)     ICD-10-CM: E11.9 ICD-9-CM: 250.00 Screening for alcoholism     ICD-10-CM: Z13.89 ICD-9-CM: V79.1 Screening for depression     ICD-10-CM: Z13.89 ICD-9-CM: V79.0 Screening for breast cancer     ICD-10-CM: Z12.31 
ICD-9-CM: V76.10 History of DVT of lower extremity     ICD-10-CM: Y66.946 ICD-9-CM: V12.51 Vitals BP Pulse Temp Resp Height(growth percentile) 100/60 (BP 1 Location: Left arm, BP Patient Position: Sitting) 68 98.7 °F (37.1 °C) (Temporal) 14 5' 3.5\" (1.613 m) Weight(growth percentile) SpO2 BMI OB Status Smoking Status 164 lb 9.6 oz (74.7 kg) 98% 28.7 kg/m2 Menopause Former Smoker BMI and BSA Data Body Mass Index Body Surface Area 28.7 kg/m 2 1.83 m 2 Preferred Pharmacy Pharmacy Name Phone [address] [phone] Your Updated Medication List  
  
   
This list is accurate as of 5/30/18  8:26 AM.  Always use your most recent med list.  
  
  
  
  
 aspirin delayed-release 81 mg tablet Take 1 Tab by mouth daily. Blood-Glucose Meter monitoring kit Use to check sugar daily as directed for type 2 diabetes, not on insulin Calcium Carbonate-Vit D3-Min 600 mg calcium- 400 unit Tab Take  by mouth. clopidogrel 75 mg Tab Commonly known as:  PLAVIX TAKE ONE TABLET BY MOUTH ONCE DAILY for heart  
  
 cyanocobalamin 1,000 mcg sublingual tablet Commonly known as:  VITAMIN B-12 Take 1,000 mcg by mouth daily. glimepiride 1 mg tablet Commonly known as:  AMARYL Daily as needed sugar >200  
  
 glucose blood VI test strips strip Commonly known as:  ONETOUCH ULTRA TEST  
USE 1 TEST STRIP EACH TIME TO TEST BLOOD SUGAR DAILY FOR DIABETES  
  
 isosorbide mononitrate ER 30 mg tablet Commonly known as:  IMDUR Take 1 Tab by mouth daily. Indications: heart lancets 33 gauge Misc Commonly known as: One Touch [place] Alexandro USE 1 LANCET EACH TIME TO TEST BLOOD SUGAR DAILY AS DIRECTED  
  
 losartan-hydroCHLOROthiazide 50-12.5 mg per tablet Commonly known as:  HYZAAR Take 1 Tab by mouth two (2) times a day. Indications: pressure and heart  
  
 metFORMIN  mg tablet Commonly known as:  GLUCOPHAGE XR  
TAKE 3 TABLETS BY MOUTH ONCE DAILY WITH  DINNER  FOR  TYPE  2  DIABETES  MELLITUS  
  
 metoprolol succinate 50 mg XL tablet Commonly known as:  TOPROL-XL Take 1 Tab by mouth daily. Indications: pressure and heart  
  
 mometasone 0.1 % topical cream  
Commonly known as:  ELOCON  
AAA BID prn itching bumps  
  
 multivitamin tablet Commonly known as:  ONE A DAY Take 1 tablet by mouth daily. nitroglycerin 0.4 mg SL tablet Commonly known as:  NITROSTAT  
by SubLINGual route every five (5) minutes as needed for Chest Pain.  
  
 pantoprazole 40 mg tablet Commonly known as:  PROTONIX Take 1 Tab by mouth daily. Indications: prevent ulcer, stomach  
  
 rosuvastatin 20 mg tablet Commonly known as:  CRESTOR  
TAKE 1 TABLET BY MOUTH NIGHTLY -- INDICATIONS -- HEART Prescriptions Sent to Pharmacy Refills  
 mometasone (ELOCON) 0.1 % topical cream 3 Sig: AAA BID prn itching bumps Class: Normal  
 Pharmacy: [address] [place] [address] [address], [address] [place] [address] #: [phone] We Performed the Following AMB POC URINE, MICROALBUMIN, SEMIQUANT (3 RESULTS) [57967 CPT(R)] HEMOGLOBIN A1C WITH EAG [80847 CPT(R)] Follow-up Instructions Return in about 3 months (around 8/30/2018). To-Do List   
 05/30/2018 Imaging:  [facility] 3D [facility] W MAMMO BI SCREENING INCL CAD Patient Instructions Learning About Benefits From Quitting Smoking How does quitting smoking make you healthier?  
 
If you're thinking about quitting smoking, you may have a few reasons to be smoke-free. Your health may be one of them. · When you quit smoking, you lower your risks for cancer, lung disease, heart attack, stroke, blood vessel disease, and blindness from macular degeneration. · When you're smoke-free, you get sick less often, and you heal faster. You are less likely to get colds, flu, bronchitis, and pneumonia. · As a nonsmoker, you may find that your mood is better and you are less stressed. When and how will you feel healthier? Quitting has real health benefits that start from day 1 of being smoke-free. And the longer you stay smoke-free, the healthier you get and the better you feel. The first hours · After just 20 minutes, your blood pressure and heart rate go down. That means there's less stress on your heart and blood vessels. · Within 12 hours, the level of carbon monoxide in your blood drops back to normal. That makes room for more oxygen. With more oxygen in your body, you may notice that you have more energy than when you smoked. After 2 weeks · Your lungs start to work better. · Your risk of heart attack starts to drop. After 1 month · When your lungs are clear, you cough less and breathe deeper, so it's easier to be active. · Your sense of taste and smell return. That means you can enjoy food more than you have since you started smoking. Over the years · After 1 year, your risk of heart disease is half what it would be if you kept smoking. · After 5 years, your risk of stroke starts to shrink. Within a few years after that, it's about the same as if you'd never smoked. · After 10 years, your risk of dying from lung cancer is cut by about half. And your risk for many other types of cancer is lower too. How would quitting help others in your life? When you quit smoking, you improve the health of everyone who now breathes in your smoke. · Their heart, lung, and cancer risks drop, much like yours. · They are sick less. For babies and small children, living smoke-free means they're less likely to have ear infections, pneumonia, and bronchitis. · If you're a woman who is or will be pregnant someday, quitting smoking means a healthier . · Children who are close to you are less likely to become adult smokers. Where can you learn more? Go to http://daryn-ibrahima.info/. Enter 052 [id number] [id number] 11 in the search box to learn more about \"Learning About Benefits From Quitting Smoking. \" Current as of: 2017 Content Version: 11.4 © 7590-2746 [employer]. Care instructions adapted under license by [employer] (which disclaims liability or warranty for this information). If you have questions about a medical condition or this instruction, always ask your healthcare professional. [address] any warranty or liability for your use of this information. Patient Instructions History Introducing [facility] & HEALTH SERVICES! Deana Silverman introduces Open Air Publishing patient portal. Now you can access parts of your medical record, email your doctor's office, and request medication refills online. 1. In your internet browser, go to [email or web address] 2. Click on the First Time User? Click Here link in the Sign In box. You will see the New Member Sign Up page. 3. Enter your Open Air Publishing Access Code exactly as it appears below. You will not need to use this code after youve completed the sign-up process. If you do not sign up before the expiration date, you must request a new code. · Open [employer] Publishing Access Code: [email or web address] Expires: 2018  8:26 AM 
 
4. Enter the last four digits of your Social Security Number (xxxx) and Date of Birth (mm/dd/yyyy) as indicated and click Submit. You will be taken to the next sign-up page. 5. Create a Open Air Publishing ID.  This will be your Open Air Publishing login ID and cannot be changed, so think of one that is secure and easy to remember. 6. Create a Hita password. You can change your password at any time. 7. Enter your Password Reset Question and Answer. This can be used at a later time if you forget your password. 8. Enter your e-mail address. You will receive e-mail notification when new information is available in [address] 9. Click Sign Up. You can now view and download portions of your medical record. 10. Click the Download Summary menu link to download a portable copy of your medical information. If you have questions, please visit the Frequently Asked Questions section of the Hita website. Remember, Hita is NOT to be used for urgent needs. For medical emergencies, dial 911. Now available from your iPhone and Android! Please provide this summary of care documentation to your next provider. Your primary care clinician is listed as Aisha Jimenez. If you have any questions after today's visit, please call [phone].

## 2018-05-31 LAB
EST. AVERAGE GLUCOSE BLD GHB EST-MCNC: 217 MG/DL
HBA1C MFR BLD: 9.2 % (ref 4.8–5.6)

## 2018-05-31 RX ORDER — METFORMIN HYDROCHLORIDE 500 MG/1
TABLET, EXTENDED RELEASE ORAL
Qty: 360 TAB | Refills: 3 | Status: SHIPPED | OUTPATIENT
Start: 2018-05-31 | End: 2018-08-29 | Stop reason: SDUPTHER

## 2018-06-01 ENCOUNTER — TELEPHONE (OUTPATIENT)
Dept: FAMILY MEDICINE CLINIC | Age: 68
End: 2018-06-01

## 2018-06-28 ENCOUNTER — TELEPHONE (OUTPATIENT)
Dept: FAMILY MEDICINE CLINIC | Age: 68
End: 2018-06-28

## 2018-06-28 NOTE — TELEPHONE ENCOUNTER
SW patient, gave her Dr Ambika Guerra response. She stated OK and that she is doing better with the itching, but just a little swelling left in her feet an on her face.

## 2018-06-28 NOTE — TELEPHONE ENCOUNTER
Becky Odell called. She was out in the yard yesterday and this morning woke up this eyes swollen and rash. She went to drug store to get Guerraview and the box said if she is 72 yr or older to contact a physican before taking. Wants a nurse to call.

## 2018-08-29 ENCOUNTER — OFFICE VISIT (OUTPATIENT)
Dept: FAMILY MEDICINE CLINIC | Age: 68
End: 2018-08-29

## 2018-08-29 VITALS
DIASTOLIC BLOOD PRESSURE: 60 MMHG | TEMPERATURE: 99.3 F | BODY MASS INDEX: 28.88 KG/M2 | OXYGEN SATURATION: 98 % | HEIGHT: 63 IN | HEART RATE: 63 BPM | WEIGHT: 163 LBS | RESPIRATION RATE: 14 BRPM | SYSTOLIC BLOOD PRESSURE: 118 MMHG

## 2018-08-29 DIAGNOSIS — E11.9 TYPE 2 DIABETES MELLITUS WITHOUT COMPLICATION, WITHOUT LONG-TERM CURRENT USE OF INSULIN (HCC): Primary | ICD-10-CM

## 2018-08-29 DIAGNOSIS — I25.10 CORONARY ARTERY DISEASE INVOLVING NATIVE CORONARY ARTERY OF NATIVE HEART WITHOUT ANGINA PECTORIS: ICD-10-CM

## 2018-08-29 DIAGNOSIS — E78.00 PURE HYPERCHOLESTEROLEMIA: ICD-10-CM

## 2018-08-29 DIAGNOSIS — I10 ESSENTIAL HYPERTENSION: ICD-10-CM

## 2018-08-29 RX ORDER — LOSARTAN POTASSIUM AND HYDROCHLOROTHIAZIDE 12.5; 5 MG/1; MG/1
1 TABLET ORAL 2 TIMES DAILY
Qty: 180 TAB | Refills: 3 | Status: SHIPPED | OUTPATIENT
Start: 2018-08-29 | End: 2019-04-05

## 2018-08-29 RX ORDER — METFORMIN HYDROCHLORIDE 500 MG/1
TABLET, EXTENDED RELEASE ORAL
Qty: 360 TAB | Refills: 3 | Status: SHIPPED | OUTPATIENT
Start: 2018-08-29 | End: 2019-04-05

## 2018-08-29 NOTE — PATIENT INSTRUCTIONS

## 2018-08-29 NOTE — MR AVS SNAPSHOT
[address] [facility] [address] 
 
 
 [address] [address] [facility],5Th Floor [address] [phone] Patient: Aureliano De La Garza MRN: [id number] Visit Information Date & Time Provider Department Dept. Phone Encounter #  
 8/29/2018  7:30 AM Renetta Boo, Tong Dunbar [id number] Follow-up Instructions Return in about 3 months (around 11/29/2018). Follow-up and Disposition History Your Appointments 11/28/2018  7:50 AM  
ESTABLISHED PATIENT with MD Adolfo Bacon 38 ([facility]) Appt Note: 3 mo f/u  
 [address] [address] [phone]  
  
   
 [address] [place] [address],[address] [address] Upcoming Health Maintenance Date Due Influenza Age 5 to Adult 9/1/2018* HEMOGLOBIN A1C Q6M 11/30/2018 FOOT EXAM Q1 12/1/2018 LIPID PANEL Q1 2/28/2019 MEDICARE YEARLY EXAM 3/1/2019 EYE EXAM RETINAL OR DILATED Q1 3/8/2019 FOBT Q 1 YEAR AGE 50-75 3/12/2019 MICROALBUMIN Q1 5/30/2019 GLAUCOMA SCREENING Q2Y 3/8/2020 BREAST CANCER SCRN MAMMOGRAM 6/5/2020 DTaP/Tdap/Td series (2 - Tdap) 5/13/2024 *Topic was postponed. The date shown is not the original due date. Allergies as of 8/29/2018  Review Complete On: 8/29/2018 By: Renetta Boo MD  
  
 Severity Noted Reaction Type Reactions Sulfa (Sulfonamide Antibiotics)  09/14/2015    Hives Lipitor [Atorvastatin] Low 10/19/2015   Topical Rash Phototoxic reaction Current Immunizations  Reviewed on 11/16/2016 Name Date DTaP 5/13/2014 Influenza High Dose Vaccine PF 9/8/2017, 11/16/2016 Influenza Vaccine 10/19/2015, 10/8/2014 Pneumococcal Conjugate (PCV-13) 10/8/2014 Pneumococcal Polysaccharide (PPSV-23) 1/18/2016 Not reviewed this visit You Were Diagnosed With   
  
 Codes Comments  Type 2 diabetes mellitus without complication, without long-term current use of insulin ([facility].)    -  Primary ICD-10-CM: E11.9 ICD-9-CM: 250.00 Essential hypertension     ICD-10-CM: I10 
ICD-9-CM: 401.9 Coronary artery disease involving native coronary artery of native heart without angina pectoris     ICD-10-CM: I25.10 ICD-9-CM: 414.01   
 Pure hypercholesterolemia     ICD-10-CM: E78.00 ICD-9-CM: 272.0 Vitals BP Pulse Temp Resp Height(growth percentile)  
 118/60 (BP 1 Location: Right arm, BP Patient Position: Sitting) 63 99.3 °F (37.4 °C) (Temporal) 14 5' 3\" (1.6 m) Weight(growth percentile) SpO2 BMI OB Status Smoking Status 163 lb (73.9 kg) 98% 28.87 kg/m2 Menopause Former Smoker BMI and BSA Data Body Mass Index Body Surface Area  
 28.87 kg/m 2 1.81 m 2 Preferred Pharmacy Pharmacy Name Phone [facility] [address] [facility] [address] [facility] [phone] Your Updated Medication List  
  
   
This list is accurate as of 8/29/18  9:09 AM.  Always use your most recent med list.  
  
  
  
  
 aspirin delayed-release 81 mg tablet Take 1 Tab by mouth daily. Blood-Glucose Meter monitoring kit Use to check sugar daily as directed for type 2 diabetes, not on insulin Calcium Carbonate-Vit D3-Min 600 mg calcium- 400 unit Tab Take  by mouth. clopidogrel 75 mg Tab Commonly known as:  PLAVIX TAKE ONE TABLET BY MOUTH ONCE DAILY for heart  
  
 cyanocobalamin 1,000 mcg sublingual tablet Commonly known as:  VITAMIN B-12 Take 1,000 mcg by mouth daily. glimepiride 1 mg tablet Commonly known as:  AMARYL Daily as needed sugar >200  
  
 glucose blood VI test strips strip Commonly known as:  ONETOUCH ULTRA TEST  
USE 1 TEST STRIP EACH TIME TO TEST BLOOD SUGAR DAILY FOR DIABETES  
  
 isosorbide mononitrate ER 30 mg tablet Commonly known as:  IMDUR Take 1 Tab by mouth daily. Indications: heart  
  
 lancets 33 gauge Misc Commonly known as: One Touch Monty Lacy USE 1 LANCET EACH TIME TO TEST BLOOD SUGAR DAILY AS DIRECTED  
  
 losartan-hydroCHLOROthiazide 50-12.5 mg per tablet Commonly known as:  HYZAAR Take 1 Tab by mouth two (2) times a day. Indications: pressure and heart  
  
 metFORMIN  mg tablet Commonly known as:  GLUCOPHAGE XR  
TAKE 4 TABLETS BY MOUTH ONCE DAILY FOR  TYPE  2  DIABETES  MELLITUS  
  
 metoprolol succinate 50 mg XL tablet Commonly known as:  TOPROL-XL Take 1 Tab by mouth daily. Indications: pressure and heart  
  
 mometasone 0.1 % topical cream  
Commonly known as:  ELOCON  
AAA BID prn itching bumps  
  
 multivitamin tablet Commonly known as:  ONE A DAY Take 1 tablet by mouth daily. nitroglycerin 0.4 mg SL tablet Commonly known as:  NITROSTAT  
by SubLINGual route every five (5) minutes as needed for Chest Pain.  
  
 pantoprazole 40 mg tablet Commonly known as:  PROTONIX Take 1 Tab by mouth daily. Indications: prevent ulcer, stomach  
  
 rosuvastatin 20 mg tablet Commonly known as:  CRESTOR  
TAKE 1 TABLET BY MOUTH NIGHTLY -- INDICATIONS -- HEART Prescriptions Sent to Pharmacy Refills  
 metFORMIN ER (GLUCOPHAGE XR) 500 mg tablet 3 Sig: TAKE 4 TABLETS BY MOUTH ONCE DAILY FOR  TYPE  2  DIABETES  MELLITUS Class: Normal  
 Pharmacy: [address] Ph #: [phone]  
 losartan-hydroCHLOROthiazide (HYZAAR) 50-12.5 mg per tablet 3 Sig: Take 1 Tab by mouth two (2) times a day. Indications: pressure and heart Class: Normal  
 Pharmacy: [address] [place] [address] [address] Ph #: [phone] Route: Oral  
  
We Performed the Following COLLECTION VENOUS BLOOD,VENIPUNCTURE G4412385 CPT(R)] HEMOGLOBIN A1C WITH EAG [12571 CPT(R)] METABOLIC PANEL, BASIC [44695 CPT(R)] Follow-up Instructions Return in about 3 months (around 11/29/2018). Patient Instructions Stopping Smoking: Care Instructions Your Care Instructions Cigarette smokers crave the nicotine in cigarettes. Giving it up is much harder than simply changing a habit. Your body has to stop craving the nicotine. It is hard to quit, but you can do it. There are many tools that people use to quit smoking. You may find that combining tools works best for you. There are several steps to quitting. First you get ready to quit. Then you get support to help you. After that, you learn new skills and behaviors to become a nonsmoker. For many people, a necessary step is getting and using medicine. Your doctor will help you set up the plan that best meets your needs. You may want to attend a smoking cessation program to help you quit smoking. When you choose a program, look for one that has proven success. Ask your doctor for ideas. You will greatly increase your chances of success if you take medicine as well as get counseling or join a cessation program. 
Some of the changes you feel when you first quit tobacco are uncomfortable. Your body will miss the nicotine at first, and you may feel short-tempered and grumpy. You may have trouble sleeping or concentrating. Medicine can help you deal with these symptoms. You may struggle with changing your smoking habits and rituals. The last step is the tricky one: Be prepared for the smoking urge to continue for a time. This is a lot to deal with, but keep at it. You will feel better. Follow-up care is a key part of your treatment and safety. Be sure to make and go to all appointments, and call your doctor if you are having problems. It's also a good idea to know your test results and keep a list of the medicines you take. How can you care for yourself at home? · Ask your family, friends, and coworkers for support. You have a better chance of quitting if you have help and support. · Join a support group, such as Nicotine Anonymous, for people who are trying to quit smoking. · Consider signing up for a smoking cessation program, such as the American Lung Association's Freedom from Smoking program. 
· Get text messaging support. Go to the website at www.smokefree. gov to sign up for the [facility] program. 
· Set a quit date. Pick your date carefully so that it is not right in the middle of a big deadline or stressful time. Once you quit, do not even take a puff. Get rid of all ashtrays and lighters after your last cigarette. Clean your house and your clothes so that they do not smell of smoke. · Learn how to be a nonsmoker. Think about ways you can avoid those things that make you reach for a cigarette. ¨ Avoid situations that put you at greatest risk for smoking. For some people, it is hard to have a drink with friends without smoking. For others, they might skip a coffee break with coworkers who smoke. ¨ Change your daily routine. Take a different route to work or eat a meal in a different place. · Cut down on stress. Calm yourself or release tension by doing an activity you enjoy, such as reading a book, taking a hot bath, or gardening. · Talk to your doctor or pharmacist about nicotine replacement therapy, which replaces the nicotine in your body. You still get nicotine but you do not use tobacco. Nicotine replacement products help you slowly reduce the amount of nicotine you need. These products come in several forms, many of them available over-the-counter: ¨ Nicotine patches ¨ Nicotine gum and lozenges ¨ Nicotine inhaler · Ask your doctor about bupropion (Wellbutrin) or varenicline (Chantix), which are prescription medicines. They do not contain nicotine. They help you by reducing withdrawal symptoms, such as stress and anxiety. · Some people find hypnosis, acupuncture, and massage helpful for ending the smoking habit. · Eat a healthy diet and get regular exercise. Having healthy habits will help your body move past its craving for nicotine. · Be prepared to keep trying. Most people are not successful the first few times they try to quit. Do not get mad at yourself if you smoke again. Make a list of things you learned and think about when you want to try again, such as next week, next month, or next year. Where can you learn more? Go to http://daryn-ibrahima.info/. Enter C420 in the search box to learn more about \"Stopping Smoking: Care Instructions. \" Current as of: November 29, 2017 Content Version: 11.7 © 0073-1945 [employer]. Care instructions adapted under license by [employer] (which disclaims liability or warranty for this information). If you have questions about a medical condition or this instruction, always ask your healthcare professional. [address] any warranty or liability for your use of this information. Introducing [facility]! Nikita Lora introduces [employer] patient portal. Now you can access parts of your medical record, email your doctor's office, and request medication refills online. 1. In your internet browser, go to [email or web address] 2. Click on the First Time User? Click Here link in the Sign In box. You will see the New Member Sign Up page. 3. Enter your [employer] Access Code exactly as it appears below. You will not need to use this code after youve completed the sign-up process. If you do not sign up before the expiration date, you must request a new code. · [employer] Access Code: [email or web address] Expires: 11/27/2018  7:23 AM 
 
4. Enter the last four digits of your Social Security Number (xxxx) and Date of Birth (mm/dd/yyyy) as indicated and click Submit. You will be taken to the next sign-up page. 5. Create a [employer] ID. This will be your [employer] login ID and cannot be changed, so think of one that is secure and easy to remember. 6. Create a [employer] password. You can change your password at any time. 7. Enter your Password Reset Question and Answer. This can be used at a later time if you forget your password. 8. Enter your e-mail address. You will receive e-mail notification when new information is available in [address] 9. Click Sign Up. You can now view and download portions of your medical record. 10. Click the Download Summary menu link to download a portable copy of your medical information. If you have questions, please visit the Frequently Asked Questions section of the [employer] website. Remember, [employer] is NOT to be used for urgent needs. For medical emergencies, dial 911. Now available from your iPhone and Android! Please provide this summary of care documentation to your next provider. Your primary care clinician is listed as Crystal Jimenez. If you have any questions after today's visit, please call [phone].

## 2018-08-29 NOTE — PROGRESS NOTES
Chief Complaint Patient presents with  Diabetes  
  check up 1. Have you been to the ER, urgent care clinic since your last visit? Hospitalized since your last visit? No 
 
2. Have you seen or consulted any other health care providers outside of the [address] [place] [address] since your last visit? Include any pap smears or colon screening. Yes heart doctor George Dc is a 77 y.o. female presenting for/with: 
 
Diabetes (check up) HPI: 
Hypertension. Blood pressures have been great. Management at last visit included con't current regimen. Current regimen: angiotensin II receptor antagonist, beta-blocker, thiazide, and nitrate. Symptoms include no symptoms. Patient denies chest pain, palpitations, dyspnea. Lab review:  
Lab Results Component Value Date/Time Sodium 141 02/28/2018 08:36 AM  
 Potassium 4.4 02/28/2018 08:36 AM  
 Chloride 98 02/28/2018 08:36 AM  
 CO2 25 02/28/2018 08:36 AM  
 Glucose 144 (H) 02/28/2018 08:36 AM  
 BUN 19 02/28/2018 08:36 AM  
 Creatinine 0.85 02/28/2018 08:36 AM  
 BUN/Creatinine ratio 22 02/28/2018 08:36 AM  
 GFR est AA 81 02/28/2018 08:36 AM  
 GFR est non-AA 70 02/28/2018 08:36 AM  
 Calcium 8.9 02/28/2018 08:36 AM  
 
Diabetes. Sugars controlled moderately, low to mid 100's. Hypoglycemia: none Tolerating current treatment well Current medications include metformin ER 2g/d (boosted last visit) and amaryl 1mg every day prn sugar >200. Lab Results Component Value Date/Time  Hemoglobin A1c 9.2 (H) 05/30/2018 08:16 AM  
 Hemoglobin A1c 7.9 (H) 02/28/2018 08:36 AM  
 Hemoglobin A1c 8.3 (H) 12/01/2017 08:58 AM  
 Glucose 144 (H) 02/28/2018 08:36 AM  
 Glucose  (A) 07/29/2015 09:55 AM  
 Microalb/Creat ratio (ug/mg creat.) 59.0 (H) 10/17/2016 09:13 AM  
 Microalbumin/creat ratio (POC) <30 05/30/2018 08:26 AM  
 LDL, calculated 58 02/28/2018 08:36 AM  
 Creatinine 0.85 02/28/2018 08:36 AM  
 
 Last eye exam performed April 2018,  Dr. Batsheva Quigley, no DR. Last foot exam performed 12/2017, warm, good capillary refill, normal DP and PT pulses and normal monofilament exam 
 
Hyperlipidemia. On crestor 20. Imani well. No myalgias, arthralgias, unusual weakness. Lab Results Component Value Date/Time Cholesterol, total 138 02/28/2018 08:36 AM  
 HDL Cholesterol 32 (L) 02/28/2018 08:36 AM  
 LDL, calculated 58 02/28/2018 08:36 AM  
 VLDL, calculated 48 (H) 02/28/2018 08:36 AM  
 Triglyceride 242 (H) 02/28/2018 08:36 AM  
 
Lab Results Component Value Date/Time ALT (SGPT) 19 02/28/2018 08:36 AM  
 AST (SGOT) 16 02/28/2018 08:36 AM  
 Alk. phosphatase 68 02/28/2018 08:36 AM  
 Bilirubin, direct 0.10 04/18/2016 08:36 AM  
 Bilirubin, total 0.4 02/28/2018 08:36 AM  
 
PMH, SH, Medications/Allergies: reviewed, on chart. Still smoking 1 ppd. ROS: 
Constitutional: No fever, chills or weight loss Respiratory: No cough, SOB  
CV: No chest pain or Palpitations Visit Vitals  /60 (BP 1 Location: Right arm, BP Patient Position: Sitting)  Pulse 63  Temp 99.3 °F (37.4 °C) (Temporal)  Resp 14  
 Ht 5' 3\" (1.6 m)  Wt 163 lb (73.9 kg)  SpO2 98%  BMI 28.87 kg/m2 Wt Readings from Last 3 Encounters:  
08/29/18 163 lb (73.9 kg) 05/30/18 164 lb 9.6 oz (74.7 kg) 02/28/18 165 lb 3.2 oz (74.9 kg) -1# 
BP Readings from Last 3 Encounters:  
08/29/18 118/60  
05/30/18 100/60  
02/28/18 140/70 Physical Examination: General appearance - alert, well appearing, and in no distress Mental status - alert, oriented to person, place, and time Eyes - pupils equal and reactive, extraocular eye movements intact. ENT - bilateral external ears and nose normal. Normal lips. OP with mod gum recession to mult lower jaw incisiors with erythema and ttp. Neck - supple, no significant adenopathy, no thyromegaly or mass Lymphatics - no palpable lymphadenopathy, no hepatosplenomegaly Chest - clear to auscultation, no wheezes, rales or rhonchi, symmetric air entry Heart - normal rate, regular rhythm, normal S1, S2, no murmurs, rubs, clicks or gallops Extremities - peripheral pulses normal, no pedal edema, no clubbing or cyanosis. A/P:  
HTN In goal. Con't current tx. HLD/CHD Remains asyx, on good regimen  Con't Crestor and BP mgmt. Labs in goal 2/18. con't to work on [employer] DM2 Weight still improving. Check A1c. metformin at 2000mg/d and amaryl. Recheck A1c, if not in goal again, Consider Januvia or tradjenta. Plan check bill/cr spring 2019. Smoking Still smoking 1/3 pack a day. Still thinking about cutting back. Brief intervention today again.  
 
F/U 3mo/PRN

## 2018-08-30 LAB
BUN SERPL-MCNC: 16 MG/DL (ref 8–27)
BUN/CREAT SERPL: 16 (ref 12–28)
CALCIUM SERPL-MCNC: 8.9 MG/DL (ref 8.7–10.3)
CHLORIDE SERPL-SCNC: 98 MMOL/L (ref 96–106)
CO2 SERPL-SCNC: 23 MMOL/L (ref 20–29)
CREAT SERPL-MCNC: 0.98 MG/DL (ref 0.57–1)
EST. AVERAGE GLUCOSE BLD GHB EST-MCNC: 214 MG/DL
GLUCOSE SERPL-MCNC: 170 MG/DL (ref 65–99)
HBA1C MFR BLD: 9.1 % (ref 4.8–5.6)
POTASSIUM SERPL-SCNC: 4.6 MMOL/L (ref 3.5–5.2)
SODIUM SERPL-SCNC: 139 MMOL/L (ref 134–144)

## 2018-08-31 ENCOUNTER — TELEPHONE (OUTPATIENT)
Dept: FAMILY MEDICINE CLINIC | Age: 68
End: 2018-08-31

## 2018-11-28 PROBLEM — F17.210 CIGARETTE NICOTINE DEPENDENCE WITHOUT COMPLICATION: Status: ACTIVE | Noted: 2018-11-28

## 2018-11-28 PROBLEM — E11.21 TYPE 2 DIABETES WITH NEPHROPATHY (HCC): Status: ACTIVE | Noted: 2018-11-28

## 2019-03-04 PROBLEM — I63.9 STROKE (CEREBRUM) (HCC): Status: ACTIVE | Noted: 2019-03-04

## 2019-03-04 PROBLEM — D35.2 PITUITARY MICROADENOMA (HCC): Status: ACTIVE | Noted: 2019-03-04

## 2019-03-04 PROBLEM — I25.2 HISTORY OF MI (MYOCARDIAL INFARCTION): Status: ACTIVE | Noted: 2019-03-04

## 2019-03-05 PROBLEM — I63.9 STROKE (HCC): Status: ACTIVE | Noted: 2019-03-05

## 2019-03-06 ENCOUNTER — HOSPITAL ENCOUNTER (OUTPATIENT)
Dept: REHABILITATION | Age: 69
End: 2019-03-26
Attending: PHYSICAL MEDICINE & REHABILITATION | Admitting: PHYSICAL MEDICINE & REHABILITATION

## 2019-03-06 DIAGNOSIS — I63.9 CEREBRAL INFARCTION (HCC): ICD-10-CM

## 2019-03-06 LAB
APPEARANCE UR: CLEAR
BACTERIA URNS QL MICRO: NEGATIVE /HPF
BILIRUB UR QL: NEGATIVE
COLOR UR: ABNORMAL
EPITH CASTS URNS QL MICRO: ABNORMAL /LPF
GLUCOSE UR STRIP.AUTO-MCNC: 100 MG/DL
HGB UR QL STRIP: NEGATIVE
HYALINE CASTS URNS QL MICRO: ABNORMAL /LPF (ref 0–5)
KETONES UR QL STRIP.AUTO: NEGATIVE MG/DL
LEUKOCYTE ESTERASE UR QL STRIP.AUTO: NEGATIVE
NITRITE UR QL STRIP.AUTO: NEGATIVE
PH UR STRIP: 6 [PH] (ref 5–8)
PROT UR STRIP-MCNC: NEGATIVE MG/DL
RBC #/AREA URNS HPF: ABNORMAL /HPF (ref 0–5)
SP GR UR REFRACTOMETRY: 1.01 (ref 1–1.03)
UA: UC IF INDICATED,UAUC: ABNORMAL
UROBILINOGEN UR QL STRIP.AUTO: 0.2 EU/DL (ref 0.2–1)
WBC URNS QL MICRO: ABNORMAL /HPF (ref 0–4)

## 2019-03-06 PROCEDURE — 81001 URINALYSIS AUTO W/SCOPE: CPT

## 2019-03-07 LAB
25(OH)D3 SERPL-MCNC: 65.6 NG/ML (ref 30–100)
ALBUMIN SERPL-MCNC: 3.3 G/DL (ref 3.5–5)
ALBUMIN/GLOB SERPL: 0.9 {RATIO} (ref 1.1–2.2)
ALP SERPL-CCNC: 61 U/L (ref 45–117)
ALT SERPL-CCNC: 18 U/L (ref 12–78)
ANION GAP SERPL CALC-SCNC: 8 MMOL/L (ref 5–15)
AST SERPL-CCNC: 14 U/L (ref 15–37)
BILIRUB SERPL-MCNC: 0.4 MG/DL (ref 0.2–1)
BUN SERPL-MCNC: 13 MG/DL (ref 6–20)
BUN/CREAT SERPL: 17 (ref 12–20)
CALCIUM SERPL-MCNC: 7.9 MG/DL (ref 8.5–10.1)
CHLORIDE SERPL-SCNC: 110 MMOL/L (ref 97–108)
CO2 SERPL-SCNC: 22 MMOL/L (ref 21–32)
CREAT SERPL-MCNC: 0.76 MG/DL (ref 0.55–1.02)
ERYTHROCYTE [DISTWIDTH] IN BLOOD BY AUTOMATED COUNT: 13.7 % (ref 11.5–14.5)
GLOBULIN SER CALC-MCNC: 3.8 G/DL (ref 2–4)
GLUCOSE SERPL-MCNC: 136 MG/DL (ref 65–100)
HCT VFR BLD AUTO: 37.3 % (ref 35–47)
HGB BLD-MCNC: 12.8 G/DL (ref 11.5–16)
MAGNESIUM SERPL-MCNC: 2.1 MG/DL (ref 1.6–2.4)
MCH RBC QN AUTO: 30.7 PG (ref 26–34)
MCHC RBC AUTO-ENTMCNC: 34.3 G/DL (ref 30–36.5)
MCV RBC AUTO: 89.4 FL (ref 80–99)
NRBC # BLD: 0 K/UL (ref 0–0.01)
NRBC BLD-RTO: 0 PER 100 WBC
PLATELET # BLD AUTO: 298 K/UL (ref 150–400)
PMV BLD AUTO: 9.8 FL (ref 8.9–12.9)
POTASSIUM SERPL-SCNC: 4 MMOL/L (ref 3.5–5.1)
PROT SERPL-MCNC: 7.1 G/DL (ref 6.4–8.2)
RBC # BLD AUTO: 4.17 M/UL (ref 3.8–5.2)
SODIUM SERPL-SCNC: 140 MMOL/L (ref 136–145)
WBC # BLD AUTO: 6.9 K/UL (ref 3.6–11)

## 2019-03-07 PROCEDURE — 85027 COMPLETE CBC AUTOMATED: CPT

## 2019-03-07 PROCEDURE — 83735 ASSAY OF MAGNESIUM: CPT

## 2019-03-07 PROCEDURE — 36415 COLL VENOUS BLD VENIPUNCTURE: CPT

## 2019-03-07 PROCEDURE — 80053 COMPREHEN METABOLIC PANEL: CPT

## 2019-03-07 PROCEDURE — 82306 VITAMIN D 25 HYDROXY: CPT

## 2019-03-22 LAB
ALBUMIN SERPL-MCNC: 3.4 G/DL (ref 3.5–5)
ALBUMIN/GLOB SERPL: 0.9 {RATIO} (ref 1.1–2.2)
ALP SERPL-CCNC: 54 U/L (ref 45–117)
ALT SERPL-CCNC: 16 U/L (ref 12–78)
ANION GAP SERPL CALC-SCNC: 9 MMOL/L (ref 5–15)
AST SERPL-CCNC: 16 U/L (ref 15–37)
BILIRUB SERPL-MCNC: 0.3 MG/DL (ref 0.2–1)
BUN SERPL-MCNC: 20 MG/DL (ref 6–20)
BUN/CREAT SERPL: 26 (ref 12–20)
CALCIUM SERPL-MCNC: 8.4 MG/DL (ref 8.5–10.1)
CHLORIDE SERPL-SCNC: 112 MMOL/L (ref 97–108)
CO2 SERPL-SCNC: 22 MMOL/L (ref 21–32)
CREAT SERPL-MCNC: 0.77 MG/DL (ref 0.55–1.02)
ERYTHROCYTE [DISTWIDTH] IN BLOOD BY AUTOMATED COUNT: 13.5 % (ref 11.5–14.5)
GLOBULIN SER CALC-MCNC: 3.6 G/DL (ref 2–4)
GLUCOSE SERPL-MCNC: 85 MG/DL (ref 65–100)
HCT VFR BLD AUTO: 36.7 % (ref 35–47)
HGB BLD-MCNC: 12.4 G/DL (ref 11.5–16)
MCH RBC QN AUTO: 29.7 PG (ref 26–34)
MCHC RBC AUTO-ENTMCNC: 33.8 G/DL (ref 30–36.5)
MCV RBC AUTO: 87.8 FL (ref 80–99)
NRBC # BLD: 0 K/UL (ref 0–0.01)
NRBC BLD-RTO: 0 PER 100 WBC
PLATELET # BLD AUTO: 266 K/UL (ref 150–400)
PMV BLD AUTO: 10 FL (ref 8.9–12.9)
POTASSIUM SERPL-SCNC: 3.6 MMOL/L (ref 3.5–5.1)
PROT SERPL-MCNC: 7 G/DL (ref 6.4–8.2)
RBC # BLD AUTO: 4.18 M/UL (ref 3.8–5.2)
SODIUM SERPL-SCNC: 143 MMOL/L (ref 136–145)
WBC # BLD AUTO: 6.7 K/UL (ref 3.6–11)

## 2019-03-22 PROCEDURE — 85027 COMPLETE CBC AUTOMATED: CPT

## 2019-03-22 PROCEDURE — 36415 COLL VENOUS BLD VENIPUNCTURE: CPT

## 2019-03-22 PROCEDURE — 80053 COMPREHEN METABOLIC PANEL: CPT

## 2019-03-26 ENCOUNTER — HOME HEALTH ADMISSION (OUTPATIENT)
Dept: HOME HEALTH SERVICES | Facility: HOME HEALTH | Age: 69
End: 2019-03-26
Payer: MEDICARE

## 2019-03-27 ENCOUNTER — HOME CARE VISIT (OUTPATIENT)
Dept: SCHEDULING | Facility: HOME HEALTH | Age: 69
End: 2019-03-27
Payer: MEDICARE

## 2019-03-27 PROCEDURE — G0299 HHS/HOSPICE OF RN EA 15 MIN: HCPCS

## 2019-03-27 PROCEDURE — 3331090001 HH PPS REVENUE CREDIT

## 2019-03-27 PROCEDURE — G0151 HHCP-SERV OF PT,EA 15 MIN: HCPCS

## 2019-03-27 PROCEDURE — 3331090002 HH PPS REVENUE DEBIT

## 2019-03-27 PROCEDURE — 400013 HH SOC

## 2019-03-28 PROCEDURE — 3331090001 HH PPS REVENUE CREDIT

## 2019-03-28 PROCEDURE — 3331090002 HH PPS REVENUE DEBIT

## 2019-03-29 ENCOUNTER — HOME CARE VISIT (OUTPATIENT)
Dept: SCHEDULING | Facility: HOME HEALTH | Age: 69
End: 2019-03-29
Payer: MEDICARE

## 2019-03-29 ENCOUNTER — HOME CARE VISIT (OUTPATIENT)
Dept: HOME HEALTH SERVICES | Facility: HOME HEALTH | Age: 69
End: 2019-03-29
Payer: MEDICARE

## 2019-03-29 VITALS
DIASTOLIC BLOOD PRESSURE: 71 MMHG | OXYGEN SATURATION: 98 % | HEART RATE: 71 BPM | TEMPERATURE: 98.4 F | SYSTOLIC BLOOD PRESSURE: 129 MMHG

## 2019-03-29 PROCEDURE — G0299 HHS/HOSPICE OF RN EA 15 MIN: HCPCS

## 2019-03-29 PROCEDURE — G0151 HHCP-SERV OF PT,EA 15 MIN: HCPCS

## 2019-03-29 PROCEDURE — 3331090001 HH PPS REVENUE CREDIT

## 2019-03-29 PROCEDURE — 3331090002 HH PPS REVENUE DEBIT

## 2019-03-30 PROCEDURE — 3331090002 HH PPS REVENUE DEBIT

## 2019-03-30 PROCEDURE — 3331090001 HH PPS REVENUE CREDIT

## 2019-03-31 ENCOUNTER — HOME CARE VISIT (OUTPATIENT)
Dept: SCHEDULING | Facility: HOME HEALTH | Age: 69
End: 2019-03-31
Payer: MEDICARE

## 2019-03-31 PROCEDURE — G0152 HHCP-SERV OF OT,EA 15 MIN: HCPCS

## 2019-03-31 PROCEDURE — 3331090002 HH PPS REVENUE DEBIT

## 2019-03-31 PROCEDURE — 3331090001 HH PPS REVENUE CREDIT

## 2019-04-01 VITALS
RESPIRATION RATE: 20 BRPM | DIASTOLIC BLOOD PRESSURE: 72 MMHG | HEART RATE: 74 BPM | SYSTOLIC BLOOD PRESSURE: 118 MMHG | TEMPERATURE: 98.1 F | OXYGEN SATURATION: 98 %

## 2019-04-01 VITALS
RESPIRATION RATE: 20 BRPM | TEMPERATURE: 98.6 F | OXYGEN SATURATION: 98 % | DIASTOLIC BLOOD PRESSURE: 70 MMHG | HEART RATE: 70 BPM | SYSTOLIC BLOOD PRESSURE: 130 MMHG

## 2019-04-01 VITALS
RESPIRATION RATE: 18 BRPM | TEMPERATURE: 98.1 F | SYSTOLIC BLOOD PRESSURE: 144 MMHG | OXYGEN SATURATION: 97 % | HEART RATE: 62 BPM | DIASTOLIC BLOOD PRESSURE: 76 MMHG

## 2019-04-01 PROCEDURE — 3331090001 HH PPS REVENUE CREDIT

## 2019-04-01 PROCEDURE — 3331090002 HH PPS REVENUE DEBIT

## 2019-04-02 ENCOUNTER — HOME CARE VISIT (OUTPATIENT)
Dept: SCHEDULING | Facility: HOME HEALTH | Age: 69
End: 2019-04-02
Payer: MEDICARE

## 2019-04-02 VITALS
DIASTOLIC BLOOD PRESSURE: 74 MMHG | SYSTOLIC BLOOD PRESSURE: 137 MMHG | OXYGEN SATURATION: 98 % | TEMPERATURE: 98.1 F | HEART RATE: 70 BPM

## 2019-04-02 PROCEDURE — G0151 HHCP-SERV OF PT,EA 15 MIN: HCPCS

## 2019-04-02 PROCEDURE — 3331090001 HH PPS REVENUE CREDIT

## 2019-04-02 PROCEDURE — G0299 HHS/HOSPICE OF RN EA 15 MIN: HCPCS

## 2019-04-02 PROCEDURE — 3331090002 HH PPS REVENUE DEBIT

## 2019-04-03 ENCOUNTER — HOME CARE VISIT (OUTPATIENT)
Dept: HOME HEALTH SERVICES | Facility: HOME HEALTH | Age: 69
End: 2019-04-03
Payer: MEDICARE

## 2019-04-03 PROCEDURE — 3331090001 HH PPS REVENUE CREDIT

## 2019-04-03 PROCEDURE — 3331090002 HH PPS REVENUE DEBIT

## 2019-04-04 ENCOUNTER — HOME CARE VISIT (OUTPATIENT)
Dept: HOME HEALTH SERVICES | Facility: HOME HEALTH | Age: 69
End: 2019-04-04
Payer: MEDICARE

## 2019-04-04 ENCOUNTER — HOME CARE VISIT (OUTPATIENT)
Dept: SCHEDULING | Facility: HOME HEALTH | Age: 69
End: 2019-04-04
Payer: MEDICARE

## 2019-04-04 VITALS
HEART RATE: 66 BPM | OXYGEN SATURATION: 97 % | SYSTOLIC BLOOD PRESSURE: 133 MMHG | DIASTOLIC BLOOD PRESSURE: 65 MMHG | RESPIRATION RATE: 18 BRPM | TEMPERATURE: 98 F

## 2019-04-04 PROCEDURE — G0299 HHS/HOSPICE OF RN EA 15 MIN: HCPCS

## 2019-04-04 PROCEDURE — 3331090001 HH PPS REVENUE CREDIT

## 2019-04-04 PROCEDURE — G0151 HHCP-SERV OF PT,EA 15 MIN: HCPCS

## 2019-04-04 PROCEDURE — 3331090002 HH PPS REVENUE DEBIT

## 2019-04-04 PROCEDURE — G0152 HHCP-SERV OF OT,EA 15 MIN: HCPCS

## 2019-04-05 VITALS
HEART RATE: 66 BPM | OXYGEN SATURATION: 96 % | SYSTOLIC BLOOD PRESSURE: 133 MMHG | TEMPERATURE: 98 F | DIASTOLIC BLOOD PRESSURE: 65 MMHG

## 2019-04-05 VITALS
TEMPERATURE: 98.2 F | HEART RATE: 78 BPM | OXYGEN SATURATION: 97 % | RESPIRATION RATE: 20 BRPM | SYSTOLIC BLOOD PRESSURE: 126 MMHG | DIASTOLIC BLOOD PRESSURE: 70 MMHG

## 2019-04-05 PROBLEM — I63.9 STROKE (HCC): Status: RESOLVED | Noted: 2019-03-05 | Resolved: 2019-04-05

## 2019-04-05 PROBLEM — I63.332 CEREBROVASCULAR ACCIDENT (CVA) DUE TO THROMBOSIS OF LEFT POSTERIOR CEREBRAL ARTERY (HCC): Status: ACTIVE | Noted: 2019-03-05

## 2019-04-05 PROCEDURE — 3331090002 HH PPS REVENUE DEBIT

## 2019-04-05 PROCEDURE — 3331090001 HH PPS REVENUE CREDIT

## 2019-04-06 PROCEDURE — 3331090001 HH PPS REVENUE CREDIT

## 2019-04-06 PROCEDURE — 3331090002 HH PPS REVENUE DEBIT

## 2019-04-07 PROCEDURE — 3331090002 HH PPS REVENUE DEBIT

## 2019-04-07 PROCEDURE — 3331090001 HH PPS REVENUE CREDIT

## 2019-04-08 ENCOUNTER — HOME CARE VISIT (OUTPATIENT)
Dept: SCHEDULING | Facility: HOME HEALTH | Age: 69
End: 2019-04-08
Payer: MEDICARE

## 2019-04-08 PROCEDURE — 3331090001 HH PPS REVENUE CREDIT

## 2019-04-08 PROCEDURE — 3331090002 HH PPS REVENUE DEBIT

## 2019-04-08 PROCEDURE — G0299 HHS/HOSPICE OF RN EA 15 MIN: HCPCS

## 2019-04-09 ENCOUNTER — HOME CARE VISIT (OUTPATIENT)
Dept: SCHEDULING | Facility: HOME HEALTH | Age: 69
End: 2019-04-09
Payer: MEDICARE

## 2019-04-09 PROCEDURE — G0151 HHCP-SERV OF PT,EA 15 MIN: HCPCS

## 2019-04-09 PROCEDURE — G0152 HHCP-SERV OF OT,EA 15 MIN: HCPCS

## 2019-04-09 PROCEDURE — 3331090001 HH PPS REVENUE CREDIT

## 2019-04-09 PROCEDURE — 3331090002 HH PPS REVENUE DEBIT

## 2019-04-10 VITALS
HEART RATE: 72 BPM | OXYGEN SATURATION: 97 % | TEMPERATURE: 98.4 F | RESPIRATION RATE: 18 BRPM | DIASTOLIC BLOOD PRESSURE: 72 MMHG | SYSTOLIC BLOOD PRESSURE: 158 MMHG

## 2019-04-10 VITALS
SYSTOLIC BLOOD PRESSURE: 142 MMHG | DIASTOLIC BLOOD PRESSURE: 76 MMHG | HEART RATE: 66 BPM | OXYGEN SATURATION: 98 % | TEMPERATURE: 98.7 F

## 2019-04-10 PROCEDURE — 3331090001 HH PPS REVENUE CREDIT

## 2019-04-10 PROCEDURE — 3331090002 HH PPS REVENUE DEBIT

## 2019-04-11 ENCOUNTER — HOME CARE VISIT (OUTPATIENT)
Dept: SCHEDULING | Facility: HOME HEALTH | Age: 69
End: 2019-04-11
Payer: MEDICARE

## 2019-04-11 VITALS
SYSTOLIC BLOOD PRESSURE: 130 MMHG | DIASTOLIC BLOOD PRESSURE: 80 MMHG | TEMPERATURE: 98.3 F | HEART RATE: 81 BPM | OXYGEN SATURATION: 95 %

## 2019-04-11 PROCEDURE — 3331090002 HH PPS REVENUE DEBIT

## 2019-04-11 PROCEDURE — G0152 HHCP-SERV OF OT,EA 15 MIN: HCPCS

## 2019-04-11 PROCEDURE — G0299 HHS/HOSPICE OF RN EA 15 MIN: HCPCS

## 2019-04-11 PROCEDURE — G0151 HHCP-SERV OF PT,EA 15 MIN: HCPCS

## 2019-04-11 PROCEDURE — 3331090001 HH PPS REVENUE CREDIT

## 2019-04-11 PROCEDURE — 3331090003 HH PPS REVENUE ADJ

## 2019-04-12 VITALS
DIASTOLIC BLOOD PRESSURE: 61 MMHG | RESPIRATION RATE: 18 BRPM | HEART RATE: 76 BPM | OXYGEN SATURATION: 97 % | TEMPERATURE: 98.2 F | SYSTOLIC BLOOD PRESSURE: 137 MMHG

## 2019-04-12 PROCEDURE — 3331090001 HH PPS REVENUE CREDIT

## 2019-04-12 PROCEDURE — 3331090002 HH PPS REVENUE DEBIT

## 2019-04-13 PROCEDURE — 3331090002 HH PPS REVENUE DEBIT

## 2019-04-13 PROCEDURE — 3331090001 HH PPS REVENUE CREDIT

## 2019-04-14 PROCEDURE — 3331090002 HH PPS REVENUE DEBIT

## 2019-04-14 PROCEDURE — 3331090001 HH PPS REVENUE CREDIT

## 2019-04-15 VITALS
RESPIRATION RATE: 20 BRPM | DIASTOLIC BLOOD PRESSURE: 70 MMHG | HEART RATE: 84 BPM | TEMPERATURE: 98.4 F | OXYGEN SATURATION: 98 % | SYSTOLIC BLOOD PRESSURE: 124 MMHG

## 2019-04-15 PROCEDURE — 3331090002 HH PPS REVENUE DEBIT

## 2019-04-15 PROCEDURE — 3331090001 HH PPS REVENUE CREDIT

## 2019-04-16 VITALS
TEMPERATURE: 98.1 F | DIASTOLIC BLOOD PRESSURE: 70 MMHG | RESPIRATION RATE: 20 BRPM | SYSTOLIC BLOOD PRESSURE: 124 MMHG | HEART RATE: 74 BPM | OXYGEN SATURATION: 98 %

## 2019-05-03 PROBLEM — D35.2 PITUITARY MICROADENOMA (HCC): Status: RESOLVED | Noted: 2019-03-04 | Resolved: 2019-05-03

## 2019-08-02 PROBLEM — Z87.891 EX-SMOKER: Status: ACTIVE | Noted: 2019-08-02

## 2020-06-08 PROBLEM — F17.210 CIGARETTE NICOTINE DEPENDENCE WITHOUT COMPLICATION: Status: RESOLVED | Noted: 2018-11-28 | Resolved: 2020-06-08

## 2020-07-21 PROBLEM — I10 HYPERTENSION: Status: ACTIVE | Noted: 2020-07-21

## 2020-07-21 PROBLEM — E11.65 HYPERGLYCEMIA DUE TO TYPE 2 DIABETES MELLITUS (HCC): Status: ACTIVE | Noted: 2020-07-21

## 2020-07-21 PROBLEM — Z86.73 HISTORY OF CVA (CEREBROVASCULAR ACCIDENT): Status: ACTIVE | Noted: 2020-07-21

## 2020-07-21 PROBLEM — R55 SYNCOPE AND COLLAPSE: Status: ACTIVE | Noted: 2020-07-21

## 2020-07-21 PROBLEM — R55 SYNCOPE: Status: ACTIVE | Noted: 2020-07-21

## 2020-07-23 ENCOUNTER — PATIENT OUTREACH (OUTPATIENT)
Dept: CASE MANAGEMENT | Age: 70
End: 2020-07-23

## 2020-07-23 NOTE — PROGRESS NOTES
Patient contacted regarding recent discharge and COVID-19 risk. Discussed COVID-19 related testing which was not done at this time. Test results were not done. Patient informed of results, if available? n/a    Care Transition Nurse/ Ambulatory Care Manager contacted the patient by telephone to perform post discharge assessment. Verified name and  with patient as identifiers. Patient has following risk factors of: diabetes, HTN. CTN/ACM reviewed discharge instructions, medical action plan and red flags related to discharge diagnosis. Reviewed and educated them on any new and changed medications related to discharge diagnosis. Advised obtaining a 90-day supply of all daily and as-needed medications. Advance Care Planning:   Does patient have an Advance Directive: yes, reviewed and current     Education provided regarding infection prevention, and signs and symptoms of COVID-19 and when to seek medical attention with patient who verbalized understanding. Discussed exposure protocols and quarantine from [address] you at higher risk for severe illness  and given an opportunity for questions and concerns. The patient agrees to contact the COVID-19 hotline [phone] or PCP office for questions related to their healthcare. CTN/ACM provided contact information for future reference. From CDC: Are you at higher risk for severe illness?  Wash your hands often.  Avoid close contact (6 feet, which is about two arm lengths) with people who are sick.  Put distance between yourself and other people if COVID-19 is spreading in your community.  Clean and disinfect frequently touched surfaces.  Avoid all cruise travel and non-essential air travel.  Call your healthcare professional if you have concerns about COVID-19 and your underlying condition or if you are sick.     For more information on steps you can take to protect yourself, see CDC's How to Protect Yourself Patient/family/caregiver given information for [employer] and agrees to enroll no  Patient's preferred e-mail:    Patient's preferred phone number:   Based on Loop alert triggers, patient will be contacted by nurse care manager for worsening symptoms. Plan for follow-up call in 7-14 days based on severity of symptoms and risk factors.     Has follow up scheduled for 8/8/20

## 2020-08-05 PROBLEM — R55 SYNCOPE: Status: RESOLVED | Noted: 2020-07-21 | Resolved: 2020-08-05

## 2020-08-06 ENCOUNTER — PATIENT OUTREACH (OUTPATIENT)
Dept: CASE MANAGEMENT | Age: 70
End: 2020-08-06

## 2020-08-06 NOTE — PROGRESS NOTES
Patient resolved from Transition of Care episode on 8/6/20  Discussed COVID-19 related testing which was not done at this time. Test results were not done. Patient informed of results, if available? n/a     Patient/family has been provided the following resources and education related to COVID-19:                         Signs, symptoms and red flags related to COVID-19            [facility] exposure and quarantine guidelines            [employer] exposure contact - [phone]            Contact for their local Department of Health                 Patient currently reports that the following symptoms have improved:  syncope (fainting). No further outreach scheduled with this CTN/ACM/LPN/HC/ MA. Episode of Care resolved. Patient has this CTN/ACM/LPN/HC/MA contact information if future needs arise.

## 2020-08-12 PROBLEM — I25.118 CORONARY ARTERY DISEASE WITH STABLE ANGINA PECTORIS (HCC): Status: ACTIVE | Noted: 2020-08-12

## 2021-04-26 ENCOUNTER — TELEPHONE (OUTPATIENT)
Dept: FAMILY MEDICINE CLINIC | Age: 71
End: 2021-04-26

## 2021-04-26 NOTE — TELEPHONE ENCOUNTER
Patients dental office (Dr Nancy Larsen) made aware of the following recommendation from Dr Jena Meza    Please inform pt/dental that due to high risk history of stroke, she should continue the clopidogrel and aspirin at normal doses, and the dentist should be able to handle the bleeding risk if they are prepared.  Dr. Shabnam Soriano

## 2021-04-26 NOTE — TELEPHONE ENCOUNTER
Received call from [facility] at Dr Tiesha Canas office. Patient is due for an extraction and office is requesting orders to stop blood thinners (Plavix and ASA).  Office is requesting a call back at [phone]

## 2021-05-11 ENCOUNTER — VIRTUAL VISIT (OUTPATIENT)
Dept: FAMILY MEDICINE CLINIC | Age: 71
End: 2021-05-11
Payer: MEDICARE

## 2021-05-11 DIAGNOSIS — I63.332 CEREBROVASCULAR ACCIDENT (CVA) DUE TO THROMBOSIS OF LEFT POSTERIOR CEREBRAL ARTERY (HCC): ICD-10-CM

## 2021-05-11 DIAGNOSIS — I25.10 CORONARY ARTERY DISEASE INVOLVING NATIVE CORONARY ARTERY OF NATIVE HEART WITHOUT ANGINA PECTORIS: ICD-10-CM

## 2021-05-11 DIAGNOSIS — E11.9 TYPE 2 DIABETES MELLITUS WITHOUT COMPLICATION, WITHOUT LONG-TERM CURRENT USE OF INSULIN (HCC): Primary | ICD-10-CM

## 2021-05-11 DIAGNOSIS — I10 ESSENTIAL HYPERTENSION: ICD-10-CM

## 2021-05-11 DIAGNOSIS — E78.00 PURE HYPERCHOLESTEROLEMIA: ICD-10-CM

## 2021-05-11 DIAGNOSIS — E11.21 TYPE 2 DIABETES WITH NEPHROPATHY (HCC): ICD-10-CM

## 2021-05-11 PROBLEM — R55 SYNCOPE AND COLLAPSE: Status: RESOLVED | Noted: 2020-07-21 | Resolved: 2021-05-11

## 2021-05-11 PROBLEM — E11.65 HYPERGLYCEMIA DUE TO TYPE 2 DIABETES MELLITUS (HCC): Status: RESOLVED | Noted: 2020-07-21 | Resolved: 2021-05-11

## 2021-05-11 PROCEDURE — G2025 DIS SITE TELE SVCS RHC/FQHC: HCPCS | Performed by: FAMILY MEDICINE

## 2021-05-11 RX ORDER — PYRIDOXINE HCL (VITAMIN B6) 100 MG
100 TABLET ORAL DAILY
COMMUNITY
End: 2021-08-10

## 2021-05-11 RX ORDER — GLIMEPIRIDE 1 MG/1
1 TABLET ORAL
Qty: 30 TAB | Refills: 3 | Status: SHIPPED | OUTPATIENT
Start: 2021-05-11 | End: 2021-09-23 | Stop reason: SDUPTHER

## 2021-05-11 RX ORDER — METFORMIN HYDROCHLORIDE 500 MG/1
TABLET, EXTENDED RELEASE ORAL
Qty: 270 TAB | Refills: 3 | Status: SHIPPED | OUTPATIENT
Start: 2021-05-11 | End: 2022-01-18

## 2021-05-11 RX ORDER — METOPROLOL SUCCINATE 50 MG/1
TABLET, EXTENDED RELEASE ORAL
Qty: 135 TAB | Refills: 3 | Status: SHIPPED | OUTPATIENT
Start: 2021-05-11 | End: 2022-01-18 | Stop reason: SINTOL

## 2021-05-11 RX ORDER — NITROGLYCERIN 0.4 MG/1
0.4 TABLET SUBLINGUAL
Qty: 20 TAB | Refills: 2 | Status: SHIPPED | OUTPATIENT
Start: 2021-05-11

## 2021-05-11 NOTE — PROGRESS NOTES
Chief Complaint   Patient presents with    Diabetes     1. Have you been to the ER, urgent care clinic since your last visit? Hospitalized since your last visit? No    2. Have you seen or consulted any other health care providers outside of the [address] [place] [address] since your last visit? Include any pap smears or colon screening. No   Pain level 0    Norma Pacheco is a 66 y.o. female who was seen by synchronous (real-time) audio technology on 5/11/2021. Pt was seen at home. Provider was at the office. No other participants in this encounter. Subjective:     HPI:  F/U L basilar CVA  At last visit we con't home rehab. Remains on ASA and plavix, statin and BP meds. Hypertension. Blood pressures up again. Were better. Management at last visit with us included change losartan to valsartan 160mg BID. Current regimen: ARB, beta-blocker, thiazide aldactone, nitrate. Symptoms include no sx. Patient denies chest pain, palpitations, dyspnea. Echo 7/22/20 was overall reassuring, with EF 55-60%. Last labs looked good but Mg was sl low, so we boosted the dose a notch. Lab review:   Lab Results   Component Value Date/Time    Sodium 141 11/13/2020 10:57 AM    Potassium 4.4 11/13/2020 10:57 AM    Chloride 102 11/13/2020 10:57 AM    CO2 26 11/13/2020 10:57 AM    Anion gap 11 07/22/2020 05:30 AM    Glucose 237 (H) 11/13/2020 10:57 AM    BUN 15 11/13/2020 10:57 AM    Creatinine 1.03 (H) 11/13/2020 10:57 AM    BUN/Creatinine ratio 15 11/13/2020 10:57 AM    GFR est AA 63 11/13/2020 10:57 AM    GFR est non-AA 54 (L) 11/13/2020 10:57 AM    Calcium 9.3 11/13/2020 10:57 AM     Magnesium   Date Value Ref Range Status   11/13/2020 1.5 (L) 1.6 - 2.3 mg/dL Final   08/12/2020 1.6 1.6 - 2.3 mg/dL Final   06/10/2020 1.9 1.6 - 2.3 mg/dL Final   11/01/2019 1.6 1.6 - 2.3 mg/dL Final   08/02/2019 1.7 1.6 - 2.3 mg/dL Final     Diabetes. Sugars controlled fairly, running, mid to high 100's.   Hypoglycemia: none   Tolerating current treatment well  Current medications include metformin ER 500mg BID, Tradjenta 5mg daily, and amaryl 1mg daily PRN for sugars >180, hasn't needed that lately. Lab Results   Component Value Date/Time    Hemoglobin A1c 8.3 (H) 11/13/2020 10:57 AM    Hemoglobin A1c 7.8 (H) 06/10/2020 11:18 AM    Hemoglobin A1c 7.4 (H) 02/03/2020 08:48 AM    Glucose 237 (H) 11/13/2020 10:57 AM    Glucose (POC) 168 (H) 07/22/2020 06:25 AM    Microalb/Creat ratio (ug/mg creat.) 26 06/10/2020 11:18 AM    Microalbumin/creat ratio (POC) <30 05/30/2018 08:26 AM    LDL, calculated 33 06/10/2020 11:18 AM    Creatinine 1.03 (H) 11/13/2020 10:57 AM     Last eye exam performed April 2018,  Dr. Jonathan Mcallister, no DR. Pt has appt in Aug 2020 with Dr. Jonathan Mcallister  Last foot exam performed 2/2020, warm, good capillary refill, normal DP and PT pulses and normal monofilament exam    Hyperlipidemia and ASCVD  On crestor 20. Imani well. No myalgias, arthralgias, unusual weakness. Seeing Dr. Sebas Guerra 6/22/2020 at [id number] [facility],[address]. Lab Results   Component Value Date/Time    Cholesterol, total 135 06/10/2020 11:18 AM    HDL Cholesterol 32 (L) 06/10/2020 11:18 AM    LDL, calculated 33 06/10/2020 11:18 AM    VLDL, calculated 70 (H) 06/10/2020 11:18 AM    Triglyceride 350 (H) 06/10/2020 11:18 AM    CHOL/HDL Ratio 4.2 03/05/2019 05:44 AM     Lab Results   Component Value Date/Time    ALT (SGPT) 22 07/21/2020 12:45 PM    Alk. phosphatase 85 07/21/2020 12:45 PM    Bilirubin, direct 0.10 04/18/2016 08:36 AM    Bilirubin, total 0.4 07/21/2020 12:45 PM     Syncope  No sx since last visit    PMH, SH, Medications/Allergies: reviewed, on chart. Current Outpatient Medications   Medication Sig    pyridoxine, vitamin B6, (Vitamin B-6) 100 mg tablet Take 100 mg by mouth daily.     clopidogreL (PLAVIX) 75 mg tab TAKE 1 TABLET BY MOUTH ONCE DAILY FOR HEART    isosorbide mononitrate ER (IMDUR) 30 mg tablet Take 1 tablet by mouth once daily    pantoprazole (PROTONIX) 40 mg tablet Take 1 tablet by mouth once daily    nitroglycerin (NITROSTAT) 0.4 mg SL tablet 0.4 mg by SubLINGual route every five (5) minutes as needed for Chest Pain. Up to 3 doses.  valsartan (DIOVAN) 160 mg tablet Take 1 Tab by mouth two (2) times a day. Indications: pressure, replaces losartan    metFORMIN ER (GLUCOPHAGE XR) 500 mg tablet TAKE 2 TABLETS BY MOUTH DAILY FOR  Sugar diabetes    rosuvastatin (CRESTOR) 20 mg tablet TAKE 1 TABLET BY MOUTH NIGHTLY FOR HEART    magnesium oxide (MAG-OX) 400 mg tablet TAKE 1 TABLET BY MOUTH THREE TIMES DAILY    aspirin (ASPIRIN) 325 mg tablet Take 1 Tab by mouth daily.  spironolactone-hydrochlorothiazide (ALDACTAZIDE) 25-25 mg per tablet Take 0.5 Tabs by mouth daily. Indications: pressure and heart    linaGLIPtin (Tradjenta) 5 mg tablet Take 1 Tab by mouth daily. Indications: sugar diabetes    lancets (One Touch Delica) 33 gauge misc USE 1 LANCET EACH TIME TO TEST BLOOD SUGAR DAILY AS DIRECTED Dx:e11.9, not on insulin    lancets (One Touch Delica) 33 gauge misc USE 1 LANCET EACH TIME TO TEST BLOOD SUGAR DAILY AS DIRECTED Dx:e11.9, not on insulin    glimepiride (AMARYL) 1 mg tablet Take 1 Tab by mouth every morning. Daily as needed sugar >200    glucose blood VI test strips (OneTouch Ultra Blue Test Strip) strip Dx:e11.9, not on insulin; check sugar daily    Blood-Glucose Meter monitoring kit Use to check sugar daily as directed for type 2 diabetes, not on insulin    metoprolol succinate (TOPROL-XL) 50 mg XL tablet Take 0.5 Tabs by mouth two (2) times a day.  Calcium Carbonate-Vit D3-Min 600 mg calcium- 400 unit tab Take  by mouth.  multivitamin (ONE A DAY) tablet Take 1 tablet by mouth daily. No current facility-administered medications for this visit.         Allergies   Allergen Reactions    Sulfa (Sulfonamide Antibiotics) Hives    Lipitor [Atorvastatin] Rash     Phototoxic reaction     ROS:  Constitutional: No fever, chills or abnormal weight loss  Respiratory: No cough, SOB   CV: No chest pain or Palpitations    VS review:  Home wt 164#  Wt Readings from Last 3 Encounters:   08/12/20 165 lb 12.8 oz (75.2 kg)   07/21/20 160 lb (72.6 kg)   06/05/20 167 lb (75.8 kg)     Home BP check 152/80. P 85. BP Readings from Last 3 Encounters:   08/12/20 140/72   07/22/20 165/73   03/02/20 135/85     Objective:     General: alert, cooperative, no distress   Mental  status: mental status: alert, oriented to person, place, and time, normal mood, behavior, speech, dress, motor activity, and thought processes   Resp: resp: normal effort and no respiratory distress   Neuro: neuro: no gross deficits         Assessment & Plan:     HTN and hx hypomag++  Pressure still a little high. Boost metoprolol ER to 25mg AM and 50mg HS. Con't valsartan 160mg BID, aldactazide 25/25 1/2 tab daily, and imdur 30. Monitor BP's, but if not getting to goal, plan boost spironolactone HCT to 25/25 and con't to monitor Mg++, rech Plan check Mg++ and CMP soon after boosting. L pontine stroke. Con't rehab, ASA 325mg daily, plavix 75mg daily, BP control (as above), lipid control. HLD/CHD  Con't Crestor and BP mgmt. Labs in goal Spring 2019. Con't to work on [employer], ASA 325mg daily, plavix 75mg daily. Recheck labs ordered. DM2  No spells of hypoglycemia lately. Last A1c a little up still. Boost metformin ER to 1500mg/d. Con't Tradjenta 5mg/d. Try change back to metformin ER 500mg BID and amaryl 1mg daily PRN sugar >200    HCM:  Ex-Smoker. Encouraged to stay quit. Flu shot UTD. FIT kit pending, has kit, just needs to collect sample and drop it off.     F/U 3mo/PRN    Time-based coding, delete if not needed: I spent at least 25 minutes with this established patient, and >50% of the time was spent counseling and/or coordinating care regarding care plan and expected course  Nomi Contreras MD    Due to this being a TeleHealth evaluation, many elements of the physical examination are unable to be assessed. We discussed the expected course, resolution and complications of the diagnosis(es) in detail. Medication risks, benefits, costs, interactions, and alternatives were discussed as indicated. I advised her to contact the office if her condition worsens, changes or fails to improve as anticipated. She expressed understanding with the diagnosis(es) and plan. Pursuant to the emergency declaration under the [facility], [facility] waiver authority and the [employer] and [employer] Act, this Virtual  Visit was conducted, with patient's consent, to reduce the patient's risk of exposure to COVID-19 and provide continuity of care for an established patient. Services were provided through audio synchronous discussion virtually to substitute for in-person clinic visit. Consent:  She and/or her healthcare decision maker is aware that this patient-initiated Telehealth encounter is a billable service, with coverage as determined by her insurance carrier. She is aware that she may receive a bill and has provided verbal consent to proceed. 1. Have you been to the ER, urgent care clinic since your last visit? Hospitalized since your last visit? No    2. Have you seen or consulted any other health care providers outside of the [address] [place] [address] since your last visit? Include any pap smears or colon screening. No    Identified pt with two pt identifiers(name and ). Reviewed record in preparation for visit and have obtained necessary documentation.

## 2021-05-18 ENCOUNTER — LAB ONLY (OUTPATIENT)
Dept: FAMILY MEDICINE CLINIC | Age: 71
End: 2021-05-18

## 2021-05-18 DIAGNOSIS — E78.00 PURE HYPERCHOLESTEROLEMIA: ICD-10-CM

## 2021-05-18 DIAGNOSIS — E11.21 TYPE 2 DIABETES WITH NEPHROPATHY (HCC): ICD-10-CM

## 2021-05-18 DIAGNOSIS — I10 ESSENTIAL HYPERTENSION: ICD-10-CM

## 2021-05-18 DIAGNOSIS — E11.9 TYPE 2 DIABETES MELLITUS WITHOUT COMPLICATION, WITHOUT LONG-TERM CURRENT USE OF INSULIN (HCC): ICD-10-CM

## 2021-05-18 LAB
ALBUMIN SERPL-MCNC: 4.3 G/DL (ref 3.5–5)
ALBUMIN/GLOB SERPL: 1.6 {RATIO} (ref 1.1–2.2)
ALP SERPL-CCNC: 67 U/L (ref 45–117)
ALT SERPL-CCNC: 23 U/L (ref 12–78)
ANION GAP SERPL CALC-SCNC: 6 MMOL/L (ref 5–15)
AST SERPL-CCNC: 17 U/L (ref 15–37)
BILIRUB SERPL-MCNC: 0.4 MG/DL (ref 0.2–1)
BUN SERPL-MCNC: 27 MG/DL (ref 6–20)
BUN/CREAT SERPL: 26 (ref 12–20)
CALCIUM SERPL-MCNC: 9.5 MG/DL (ref 8.5–10.1)
CHLORIDE SERPL-SCNC: 105 MMOL/L (ref 97–108)
CHOLEST SERPL-MCNC: 137 MG/DL
CO2 SERPL-SCNC: 26 MMOL/L (ref 21–32)
CREAT SERPL-MCNC: 1.04 MG/DL (ref 0.55–1.02)
EST. AVERAGE GLUCOSE BLD GHB EST-MCNC: 192 MG/DL
GLOBULIN SER CALC-MCNC: 2.7 G/DL (ref 2–4)
GLUCOSE SERPL-MCNC: 214 MG/DL (ref 65–100)
HBA1C MFR BLD: 8.3 % (ref 4–5.6)
HDLC SERPL-MCNC: 34 MG/DL
HDLC SERPL: 4 {RATIO} (ref 0–5)
LDLC SERPL CALC-MCNC: 34.2 MG/DL (ref 0–100)
MAGNESIUM SERPL-MCNC: 2 MG/DL (ref 1.6–2.4)
POTASSIUM SERPL-SCNC: 4.7 MMOL/L (ref 3.5–5.1)
PROT SERPL-MCNC: 7 G/DL (ref 6.4–8.2)
SODIUM SERPL-SCNC: 137 MMOL/L (ref 136–145)
TRIGL SERPL-MCNC: 344 MG/DL (ref ?–150)
VLDLC SERPL CALC-MCNC: 68.8 MG/DL

## 2021-08-10 ENCOUNTER — OFFICE VISIT (OUTPATIENT)
Dept: FAMILY MEDICINE CLINIC | Age: 71
End: 2021-08-10
Payer: MEDICARE

## 2021-08-10 VITALS
WEIGHT: 165.6 LBS | BODY MASS INDEX: 27.59 KG/M2 | TEMPERATURE: 97.6 F | RESPIRATION RATE: 18 BRPM | HEIGHT: 65 IN | DIASTOLIC BLOOD PRESSURE: 76 MMHG | HEART RATE: 75 BPM | SYSTOLIC BLOOD PRESSURE: 144 MMHG | OXYGEN SATURATION: 96 %

## 2021-08-10 DIAGNOSIS — I10 ESSENTIAL HYPERTENSION: ICD-10-CM

## 2021-08-10 DIAGNOSIS — I63.332 CEREBROVASCULAR ACCIDENT (CVA) DUE TO THROMBOSIS OF LEFT POSTERIOR CEREBRAL ARTERY (HCC): ICD-10-CM

## 2021-08-10 DIAGNOSIS — E11.21 TYPE 2 DIABETES WITH NEPHROPATHY (HCC): Primary | ICD-10-CM

## 2021-08-10 DIAGNOSIS — I25.10 CORONARY ARTERY DISEASE INVOLVING NATIVE CORONARY ARTERY OF NATIVE HEART WITHOUT ANGINA PECTORIS: ICD-10-CM

## 2021-08-10 DIAGNOSIS — I25.118 CORONARY ARTERY DISEASE OF NATIVE ARTERY OF NATIVE HEART WITH STABLE ANGINA PECTORIS (HCC): ICD-10-CM

## 2021-08-10 PROCEDURE — 99214 OFFICE O/P EST MOD 30 MIN: CPT | Performed by: FAMILY MEDICINE

## 2021-08-10 RX ORDER — LANOLIN ALCOHOL/MO/W.PET/CERES
1000 CREAM (GRAM) TOPICAL DAILY
COMMUNITY

## 2021-08-10 NOTE — PROGRESS NOTES
Chief Complaint   Patient presents with    Medication Evaluation     Patient is taking plavix and an allergy alert came over for her having a rash with it, patient is also taking metoprolol and claims it makes her weak     1. Have you been to the ER, urgent care clinic since your last visit? Hospitalized since your last visit? No    2. Have you seen or consulted any other health care providers outside of the [address] [place] [address] since your last visit? Include any pap smears or colon screening. No   Pain level 0    Zee Giles is a 66 y.o. female who was seen by synchronous (real-time) audio technology on 8/10/2021. Pt was seen at home. Provider was at the office. No other participants in this encounter. Subjective:     HPI:  F/U L basilar CVA  At last visit we con't home rehab. Remains on ASA and plavix, statin and BP meds. Hypertension. Blood pressures up last visit, so we tried boosting metoprolol ER to 25mg AM and 50mg hs, but felt really tired with that and worn down, so back to 25mg BID. Current regimen: ARB, beta-blocker, thiazide aldactone, nitrate. Symptoms include no sx. Patient denies chest pain, palpitations, dyspnea. Echo 7/22/20 was overall reassuring, with EF 55-60%. Last labs looked good but Mg was sl low, so we boosted the dose a notch.      Lab review:   Lab Results   Component Value Date/Time    Sodium 137 05/18/2021 09:55 AM    Potassium 4.7 05/18/2021 09:55 AM    Chloride 105 05/18/2021 09:55 AM    CO2 26 05/18/2021 09:55 AM    Anion gap 6 05/18/2021 09:55 AM    Glucose 214 (H) 05/18/2021 09:55 AM    BUN 27 (H) 05/18/2021 09:55 AM    Creatinine 1.04 (H) 05/18/2021 09:55 AM    BUN/Creatinine ratio 26 (H) 05/18/2021 09:55 AM    GFR est AA >60 05/18/2021 09:55 AM    GFR est non-AA 52 (L) 05/18/2021 09:55 AM    Calcium 9.5 05/18/2021 09:55 AM     Magnesium   Date Value Ref Range Status   05/18/2021 2.0 1.6 - 2.4 mg/dL Final   11/13/2020 1.5 (L) 1.6 - 2.3 mg/dL Final   08/12/2020 1. 6 1.6 - 2.3 mg/dL Final   06/10/2020 1.9 1.6 - 2.3 mg/dL Final   11/01/2019 1.6 1.6 - 2.3 mg/dL Final     Diabetes. Sugars controlled fairly, running, mid to high 100's. Hypoglycemia: none   Tolerating current treatment so/so,   Current medications include metformin ER 500mg BID, Tradjenta 5mg daily, and amaryl 1mg daily PRN for sugars >180, hasn't needed that lately. Lab Results   Component Value Date/Time    Hemoglobin A1c 8.3 (H) 05/18/2021 09:55 AM    Hemoglobin A1c 8.3 (H) 11/13/2020 10:57 AM    Hemoglobin A1c 7.8 (H) 06/10/2020 11:18 AM    Glucose 214 (H) 05/18/2021 09:55 AM    Glucose (POC) 168 (H) 07/22/2020 06:25 AM    Microalb/Creat ratio (ug/mg creat.) 26 06/10/2020 11:18 AM    Microalbumin/creat ratio (POC) <30 05/30/2018 08:26 AM    LDL, calculated 34.2 05/18/2021 09:55 AM    Creatinine 1.04 (H) 05/18/2021 09:55 AM     Last eye exam performed April 2018,  Dr. Cleopatra Calderon, no DR. Pt has appt in Aug 2020 with Dr. Cleopatra Calderon  Last foot exam performed 2/2020, warm, good capillary refill, normal DP and PT pulses and normal monofilament exam    Hyperlipidemia and ASCVD  On crestor 20. Imani well. No myalgias, arthralgias, unusual weakness. Seeing Dr. Siena Espinal 6/22/2020 at [id number] [facility]. Lab Results   Component Value Date/Time    Cholesterol, total 137 05/18/2021 09:55 AM    HDL Cholesterol 34 05/18/2021 09:55 AM    LDL, calculated 34.2 05/18/2021 09:55 AM    VLDL, calculated 68.8 05/18/2021 09:55 AM    Triglyceride 344 (H) 05/18/2021 09:55 AM    CHOL/HDL Ratio 4.0 05/18/2021 09:55 AM     Lab Results   Component Value Date/Time    ALT (SGPT) 23 05/18/2021 09:55 AM    Alk. phosphatase 67 05/18/2021 09:55 AM    Bilirubin, direct 0.10 04/18/2016 08:36 AM    Bilirubin, total 0.4 05/18/2021 09:55 AM     Syncope  No sx since last visit    PMH, SH, Medications/Allergies: reviewed, on chart. Current Outpatient Medications   Medication Sig    cyanocobalamin 1,000 mcg tablet Take 1,000 mcg by mouth daily.     lancets (OneTouch Delica Plus Lancet) 33 gauge misc Dx:e11.9, not on insulin; check sugar daily as directed.  spironolactone-hydrochlorothiazide (ALDACTAZIDE) 25-25 mg per tablet Take 0.5 Tablets by mouth daily. Indications: pressure and heart    glucose blood VI test strips (OneTouch Ultra Blue Test Strip) strip Dx:e11.9, not on insulin; check sugar daily    nitroglycerin (NITROSTAT) 0.4 mg SL tablet 1 Tab by SubLINGual route every five (5) minutes as needed for Chest Pain for up to 3 doses. Up to 3 doses.  metFORMIN ER (GLUCOPHAGE XR) 500 mg tablet TAKE 3 TABLETS BY MOUTH DAILY FOR  Sugar diabetes    metoprolol succinate (TOPROL-XL) 50 mg XL tablet 0.5 tab AM and 1 tab HS  Indications: pressure and heart    glimepiride (AMARYL) 1 mg tablet Take 1 Tab by mouth every morning. Daily as needed sugar >200    clopidogreL (PLAVIX) 75 mg tab TAKE 1 TABLET BY MOUTH ONCE DAILY FOR HEART    isosorbide mononitrate ER (IMDUR) 30 mg tablet Take 1 tablet by mouth once daily    pantoprazole (PROTONIX) 40 mg tablet Take 1 tablet by mouth once daily    valsartan (DIOVAN) 160 mg tablet Take 1 Tab by mouth two (2) times a day. Indications: pressure, replaces losartan    rosuvastatin (CRESTOR) 20 mg tablet TAKE 1 TABLET BY MOUTH NIGHTLY FOR HEART    magnesium oxide (MAG-OX) 400 mg tablet TAKE 1 TABLET BY MOUTH THREE TIMES DAILY    aspirin (ASPIRIN) 325 mg tablet Take 1 Tab by mouth daily.  linaGLIPtin (Tradjenta) 5 mg tablet Take 1 Tab by mouth daily. Indications: sugar diabetes    lancets (One Touch Delica) 33 gauge misc USE 1 LANCET EACH TIME TO TEST BLOOD SUGAR DAILY AS DIRECTED Dx:e11.9, not on insulin    lancets (One Touch Delica) 33 gauge [facility] USE 1 LANCET EACH TIME TO TEST BLOOD SUGAR DAILY AS DIRECTED Dx:e11.9, not on insulin    Blood-Glucose Meter monitoring kit Use to check sugar daily as directed for type 2 diabetes, not on insulin    Calcium Carbonate-Vit D3-Min 600 mg calcium- 400 unit tab Take  by mouth.  multivitamin (ONE A DAY) tablet Take 1 tablet by mouth daily. No current facility-administered medications for this visit. Allergies   Allergen Reactions    Clopidogrel Bisulfate Rash    Sulfa (Sulfonamide Antibiotics) Hives    Ticagrelor Rash    Lipitor [Atorvastatin] Rash     Phototoxic reaction     ROS:  Constitutional: No fever, chills or abnormal weight loss  Respiratory: No cough, SOB   CV: No chest pain or Palpitations    VS review:  Visit Vitals  BP (!) 144/76 (BP 1 Location: Right arm)   Pulse 75   Temp 97.6 °F (36.4 °C) (Temporal)   Resp 18   Ht 5' 11\" (1.803 m)   Wt 165 lb 9.6 oz (75.1 kg)   SpO2 96%   BMI 23.10 kg/m²     Wt Readings from Last 3 Encounters:   08/10/21 165 lb 9.6 oz (75.1 kg)   08/12/20 165 lb 12.8 oz (75.2 kg)   07/21/20 160 lb (72.6 kg)     BP Readings from Last 3 Encounters:   08/10/21 (!) 144/76   08/12/20 140/72   07/22/20 165/73     Objective:     General: alert, cooperative, no distress   Mental  status: mental status: alert, oriented to person, place, and time, normal mood, behavior, speech, dress, motor activity, and thought processes   Resp: resp: normal effort and no respiratory distress   Neuro: neuro: no gross deficits         Assessment & Plan:     HTN and hx hypomag++  Pressure still a little high. Didn't tiffanie higher dose metoprolol, so keep that at 25mg BID. Con't valsartan 160mg BID, Try add jardiance (as below) aldactazide 12/12mg daily, and con't imdur 30mg/d. Monitor BP's, con't to monitor Mg++, rech Plan check Mg++ and CMP in 2 weeks. L pontine stroke. Con't rehab, ASA 325mg daily. Had alert re: cross reaction with brilinta, but hasn't had any issue with plavix, so con't plavix 75mg daily, BP control (as above), lipid control. HLD/CHD  Con't Crestor and BP mgmt. Labs in goal Spring 2021. Con't to work on [employer], ASA 325mg daily, plavix 75mg daily. Recheck labs ordered. DM2  No spells of hypoglycemia lately. Last A1c a little up still. Didn't do well with boosted metformin. Try cutting back to 500mg/d. Con't Tradjenta 5mg/d. Add jardiance 5mg/d. Check labs in 2 wk. HCM:  Ex-Smoker. Encouraged to stay quit. Flu shot UTD. FIT kit pending, has kit, just needs to collect sample and drop it off. F/U 3mo/PRN    1. Have you been to the ER, urgent care clinic since your last visit? Hospitalized since your last visit? No    2. Have you seen or consulted any other health care providers outside of the [address] [place] [address] since your last visit? Include any pap smears or colon screening. No    Identified pt with two pt identifiers(name and ). Reviewed record in preparation for visit and have obtained necessary documentation.     Symptom review:    NO  Fever   NO  Shaking chills  NO  Cough  NO Headaches  NO  Body aches  NO  Coughing up blood  NO  Chest congestion  NO  Chest pain  NO  Shortness of breath  NO  Profound Loss of smell/taste  NO  Nausea/Vomiting   NO  Loose stool/Diarrhea  NO  any skin issues

## 2021-09-13 ENCOUNTER — LAB ONLY (OUTPATIENT)
Dept: FAMILY MEDICINE CLINIC | Age: 71
End: 2021-09-13

## 2021-09-13 DIAGNOSIS — I10 ESSENTIAL HYPERTENSION: ICD-10-CM

## 2021-09-13 DIAGNOSIS — I25.118 CORONARY ARTERY DISEASE OF NATIVE ARTERY OF NATIVE HEART WITH STABLE ANGINA PECTORIS (HCC): ICD-10-CM

## 2021-09-14 LAB — MAGNESIUM SERPL-MCNC: 1.5 MG/DL (ref 1.6–2.4)

## 2021-09-14 NOTE — PROGRESS NOTES
[facility] for results and to let us know if she is taking 3 pills as ordered?

## 2021-09-15 ENCOUNTER — TELEPHONE (OUTPATIENT)
Dept: FAMILY MEDICINE CLINIC | Age: 71
End: 2021-09-15

## 2021-09-15 NOTE — TELEPHONE ENCOUNTER
----- Message from Johnny Milligan sent at 9/15/2021 12:37 PM EDT -----  Regarding: Dr. Fu Jess: [phone]  General Message/Vendor Calls    Caller's first and last name:Pt      Reason for call:Pt would like a call back to discuss the results of her blood work. Callback required yes/no and why:yes, discuss.        Best contact number(s):[phone]      Details to clarify the request:Salima Milligan

## 2021-09-21 ENCOUNTER — TELEPHONE (OUTPATIENT)
Dept: FAMILY MEDICINE CLINIC | Age: 71
End: 2021-09-21

## 2021-09-21 NOTE — TELEPHONE ENCOUNTER
Patient would like to speak with Dr. Khushbu Cool or nurse regarding the Ilean Guise that she was put on. Has concerns with the side affects that she has read about. Questions if she really needs to be on this extra medicine.

## 2021-09-21 NOTE — TELEPHONE ENCOUNTER
Spoke to pt. Advised her that medication was added due to A1c being elevated. Per note she is to start medication and recheck labs in 2 weeks. She stated she will start medication and call office with any issues/side effects.

## 2021-09-23 DIAGNOSIS — E11.9 TYPE 2 DIABETES MELLITUS WITHOUT COMPLICATION, WITHOUT LONG-TERM CURRENT USE OF INSULIN (HCC): ICD-10-CM

## 2021-09-23 RX ORDER — LINAGLIPTIN 5 MG/1
5 TABLET, FILM COATED ORAL DAILY
Qty: 90 TABLET | Refills: 3 | Status: SHIPPED | OUTPATIENT
Start: 2021-09-23

## 2021-09-23 RX ORDER — GLIMEPIRIDE 1 MG/1
1 TABLET ORAL
Qty: 30 TABLET | Refills: 3 | Status: SHIPPED | OUTPATIENT
Start: 2021-09-23 | End: 2021-09-27

## 2021-09-27 DIAGNOSIS — E11.9 TYPE 2 DIABETES MELLITUS WITHOUT COMPLICATION, WITHOUT LONG-TERM CURRENT USE OF INSULIN (HCC): Primary | ICD-10-CM

## 2021-09-27 RX ORDER — GLIMEPIRIDE 1 MG/1
TABLET ORAL
Qty: 30 TABLET | Refills: 1 | Status: SHIPPED | OUTPATIENT
Start: 2021-09-27

## 2021-11-16 ENCOUNTER — TELEPHONE (OUTPATIENT)
Dept: FAMILY MEDICINE CLINIC | Age: 71
End: 2021-11-16

## 2022-01-11 ENCOUNTER — OFFICE VISIT (OUTPATIENT)
Dept: FAMILY MEDICINE CLINIC | Age: 72
End: 2022-01-11
Payer: MEDICARE

## 2022-01-11 DIAGNOSIS — Z23 NEEDS FLU SHOT: Primary | ICD-10-CM

## 2022-01-11 PROCEDURE — G0008 ADMIN INFLUENZA VIRUS VAC: HCPCS | Performed by: FAMILY MEDICINE

## 2022-01-11 PROCEDURE — 90694 VACC AIIV4 NO PRSRV 0.5ML IM: CPT | Performed by: FAMILY MEDICINE

## 2022-01-11 NOTE — PROGRESS NOTES
After obtaining consent, and per orders of Dr. Dawood Muñoz, injection of Fluad given by Ladonna Barr LPN. Patient instructed to remain in clinic for 20 minutes afterwards, and to report any adverse reaction to me immediately.

## 2022-01-11 NOTE — PATIENT INSTRUCTIONS
Vaccine Information Statement    Influenza (Flu) Vaccine (Inactivated or Recombinant): What You Need to Know    Many vaccine information statements are available in Wolof and other languages. See www.immunize.org/vis. Hojas de información sobre vacunas están disponibles en español y en muchos otros idiomas. Visite www.immunize.org/vis. 1. Why get vaccinated? Influenza vaccine can prevent influenza (flu). Flu is a contagious disease that spreads around the [facility] every year, usually between October and May. Anyone can get the flu, but it is more dangerous for some people. Infants and young children, people 72 years and older, pregnant people, and people with certain health conditions or a weakened immune system are at greatest risk of flu complications. Pneumonia, bronchitis, sinus infections, and ear infections are examples of flu-related complications. If you have a medical condition, such as heart disease, cancer, or diabetes, flu can make it worse. Flu can cause fever and chills, sore throat, muscle aches, fatigue, cough, headache, and runny or stuffy nose. Some people may have vomiting and diarrhea, though this is more common in children than adults. In an average year, thousands of people in the [facility] die from flu, and many more are hospitalized. Flu vaccine prevents millions of illnesses and flu-related visits to the doctor each year. 2. Influenza vaccines     CDC recommends everyone 6 months and older get vaccinated every flu season. Children 6 months through 6years of age may need 2 doses during a single flu season. Everyone else needs only 1 dose each flu season. It takes about 2 weeks for protection to develop after vaccination. There are many flu viruses, and they are always changing. Each year a new flu vaccine is made to protect against the influenza viruses believed to be likely to cause disease in the upcoming flu season.  Even when the vaccine doesnt exactly match these viruses, it may still provide some protection. Influenza vaccine does not cause flu. Influenza vaccine may be given at the same time as other vaccines. 3. Talk with your health care provider    Tell your vaccination provider if the person getting the vaccine:   Has had an allergic reaction after a previous dose of influenza vaccine, or has any severe, life-threatening allergies    Has ever had Guillain-Barré Syndrome (also called GBS)    In some cases, your health care provider may decide to postpone influenza vaccination until a future visit. Influenza vaccine can be administered at any time during pregnancy. People who are or will be pregnant during influenza season should receive inactivated influenza vaccine. People with minor illnesses, such as a cold, may be vaccinated. People who are moderately or severely ill should usually wait until they recover before getting influenza vaccine. Your health care provider can give you more information. 4. Risks of a vaccine reaction     Soreness, redness, and swelling where the shot is given, fever, muscle aches, and headache can happen after influenza vaccination.  There may be a very small increased risk of Guillain-Barré Syndrome (GBS) after inactivated influenza vaccine (the flu shot). [place] children who get the flu shot along with pneumococcal vaccine (PCV13) and/or DTaP vaccine at the same time might be slightly more likely to have a seizure caused by fever. Tell your health care provider if a child who is getting flu vaccine has ever had a seizure. People sometimes faint after medical procedures, including vaccination. Tell your provider if you feel dizzy or have vision changes or ringing in the ears. As with any medicine, there is a very remote chance of a vaccine causing a severe allergic reaction, other serious injury, or death. 5. What if there is a serious problem?     An allergic reaction could occur after the vaccinated person leaves the clinic. If you see signs of a severe allergic reaction (hives, swelling of the face and throat, difficulty breathing, a fast heartbeat, dizziness, or weakness), call 9-1-1 and get the person to the nearest hospital.    For other signs that concern you, call your health care provider. Adverse reactions should be reported to the Vaccine Adverse Event Reporting System (VAERS). Your health care provider will usually file this report, or you can do it yourself. Visit the VAERS website at www.vaers. [facility].gov or call 2-715.828.6920. VAERS is only for reporting reactions, and VAERS staff members do not give medical advice. 6. The National Vaccine Injury Compensation Program    The [facility] Vaccine Injury Compensation Program (VICP) is a federal program that was created to compensate people who may have been injured by certain vaccines. Claims regarding alleged injury or death due to vaccination have a time limit for filing, which may be as short as two years. Visit the VICP website at www.[facility].gov/vaccinecompensation or call 1-627.129.9234 to learn about the program and about filing a claim. 7. How can I learn more?  Ask your health care provider.  Call your local or state health department.  Visit the website of the Food and Drug Administration (FDA) for vaccine package inserts and additional information at www.fda.gov/vaccines-blood-biologics/vaccines.  Contact the Centers for Disease Control and Prevention (CDC):  - Call 9-909.363.1200 (1-800-CDC-INFO) or  - Visit CDCs influenza website at www.cdc.gov/flu. Vaccine Information Statement   Inactivated Influenza Vaccine   8/6/2021  42 AMISH Castro [id number]-02   Department of Health and Human Services  Centers for Disease Control and Prevention    Office Use Only

## 2022-01-18 ENCOUNTER — OFFICE VISIT (OUTPATIENT)
Dept: FAMILY MEDICINE CLINIC | Age: 72
End: 2022-01-18
Payer: MEDICARE

## 2022-01-18 VITALS
HEART RATE: 97 BPM | TEMPERATURE: 97.3 F | HEIGHT: 65 IN | OXYGEN SATURATION: 98 % | DIASTOLIC BLOOD PRESSURE: 68 MMHG | RESPIRATION RATE: 22 BRPM | WEIGHT: 160.6 LBS | SYSTOLIC BLOOD PRESSURE: 150 MMHG | BODY MASS INDEX: 26.76 KG/M2

## 2022-01-18 DIAGNOSIS — K21.9 GASTROESOPHAGEAL REFLUX DISEASE WITHOUT ESOPHAGITIS: ICD-10-CM

## 2022-01-18 DIAGNOSIS — E11.9 TYPE 2 DIABETES MELLITUS WITHOUT COMPLICATION, WITHOUT LONG-TERM CURRENT USE OF INSULIN (HCC): ICD-10-CM

## 2022-01-18 DIAGNOSIS — I25.118 CORONARY ARTERY DISEASE OF NATIVE ARTERY OF NATIVE HEART WITH STABLE ANGINA PECTORIS (HCC): ICD-10-CM

## 2022-01-18 DIAGNOSIS — Z00.00 MEDICARE ANNUAL WELLNESS VISIT, SUBSEQUENT: Primary | ICD-10-CM

## 2022-01-18 DIAGNOSIS — I25.83 CORONARY ARTERY DISEASE DUE TO LIPID RICH PLAQUE: ICD-10-CM

## 2022-01-18 DIAGNOSIS — E11.21 TYPE 2 DIABETES WITH NEPHROPATHY (HCC): ICD-10-CM

## 2022-01-18 DIAGNOSIS — I10 ESSENTIAL HYPERTENSION: ICD-10-CM

## 2022-01-18 DIAGNOSIS — I25.10 CORONARY ARTERY DISEASE DUE TO LIPID RICH PLAQUE: ICD-10-CM

## 2022-01-18 DIAGNOSIS — Z13.31 SCREENING FOR DEPRESSION: ICD-10-CM

## 2022-01-18 DIAGNOSIS — Z12.11 COLON CANCER SCREENING: ICD-10-CM

## 2022-01-18 DIAGNOSIS — Z13.39 SCREENING FOR ALCOHOLISM: ICD-10-CM

## 2022-01-18 DIAGNOSIS — E78.00 PURE HYPERCHOLESTEROLEMIA: ICD-10-CM

## 2022-01-18 DIAGNOSIS — I63.332 CEREBROVASCULAR ACCIDENT (CVA) DUE TO THROMBOSIS OF LEFT POSTERIOR CEREBRAL ARTERY (HCC): ICD-10-CM

## 2022-01-18 DIAGNOSIS — I25.10 CORONARY ARTERY DISEASE INVOLVING NATIVE CORONARY ARTERY OF NATIVE HEART WITHOUT ANGINA PECTORIS: ICD-10-CM

## 2022-01-18 PROCEDURE — G0439 PPPS, SUBSEQ VISIT: HCPCS | Performed by: FAMILY MEDICINE

## 2022-01-18 PROCEDURE — 99214 OFFICE O/P EST MOD 30 MIN: CPT | Performed by: FAMILY MEDICINE

## 2022-01-18 PROCEDURE — G0444 DEPRESSION SCREEN ANNUAL: HCPCS | Performed by: FAMILY MEDICINE

## 2022-01-18 RX ORDER — VALSARTAN 160 MG/1
160 TABLET ORAL 2 TIMES DAILY
Qty: 180 TABLET | Refills: 3 | Status: SHIPPED | OUTPATIENT
Start: 2022-01-18

## 2022-01-18 RX ORDER — CARVEDILOL 3.12 MG/1
3.12 TABLET ORAL 2 TIMES DAILY WITH MEALS
Qty: 60 TABLET | Refills: 6 | Status: SHIPPED | OUTPATIENT
Start: 2022-01-18 | End: 2022-03-14 | Stop reason: SDUPTHER

## 2022-01-18 RX ORDER — PANTOPRAZOLE SODIUM 40 MG/1
40 TABLET, DELAYED RELEASE ORAL DAILY
Qty: 90 TABLET | Refills: 3 | Status: SHIPPED | OUTPATIENT
Start: 2022-01-18

## 2022-01-18 RX ORDER — ROSUVASTATIN CALCIUM 20 MG/1
TABLET, COATED ORAL
Qty: 90 TABLET | Refills: 3 | Status: SHIPPED | OUTPATIENT
Start: 2022-01-18

## 2022-01-18 RX ORDER — METFORMIN HYDROCHLORIDE 500 MG/1
TABLET, EXTENDED RELEASE ORAL
Qty: 270 TABLET | Refills: 0
Start: 2022-01-18 | End: 2022-03-03

## 2022-01-18 NOTE — PATIENT INSTRUCTIONS
Medicare Wellness Visit    The best way to live healthy is to have a lifestyle where you eat a well-balanced diet, exercise regularly, limit alcohol use, and quit all forms of tobacco/nicotine, if applicable. Regular preventive services are another way to keep healthy. Preventive services (vaccines, screening tests, monitoring & exams) can help personalize your care plan, which helps you manage your own care. Screening tests can find health problems at the earliest stages, when they are easiest to treat. [facility] follows the current, evidence-based guidelines published by the [facility] ([facility]) when recommending preventive services for our patients. Because we follow these guidelines, sometimes recommendations change over time as research supports it. (For example, a prostate screening blood test is no longer routinely recommended for men with no symptoms.)  Of course, you and your doctor may decide to screen more often for some diseases, based on your risk and co-morbidities (chronic disease you are already diagnosed with). Preventive services for you include:  - Medicare offers their members a free annual wellness visit, which is time for you and your primary care provider to discuss and plan for your preventive service needs. Take advantage of this benefit every year!     Here is a list of your current Health Maintenance items (your personalized list of preventive services) with a due date:    Health Maintenance Due   Topic Date Due    Shingles Vaccine (1 of 2) Never done    Colorectal Screening  03/12/2019    COVID-19 Vaccine (3 - Booster for Moderna series) 10/11/2021

## 2022-01-18 NOTE — PROGRESS NOTES
Chief Complaint   Patient presents with    Annual Wellness Visit    Hypertension     1. Have you been to the ER, urgent care clinic since your last visit? Hospitalized since your last visit? No    2. Have you seen or consulted any other health care providers outside of the [address] [place] [address] since your last visit? Include any pap smears or colon screening. No   Pain level 0    Esdras David is a 66 y.o. female     Subjective:     HPI:  F/U L basilar CVA  At last visit we con't current regimen. Remains on ASA and plavix, statin and BP meds, but having issues with b-blocker as below    Hypertension. Blood pressures up last visit, so we tried con't current tx and adding jardiance to help with sugar. PT decided that the metoprolol ER 25mg AM is a problem, and weaned down to 12.5mg QHS. Still feeling really tired with that and worn down with that. Would like to change that. Current regimen: ARB, beta-blocker, thiazide aldactone, nitrate. Symptoms include fatigue. No palpitations with changing the b-blocker dose, but pulse up a little. Patient denies chest pain, dyspnea. Echo 7/22/20 was overall reassuring, with EF 55-60%. Last labs looked good but Mg was sl low, so we boosted the dose a notch.      Lab review:   Lab Results   Component Value Date/Time    Sodium 138 10/07/2021 11:07 AM    Potassium 4.3 10/07/2021 11:07 AM    Chloride 106 10/07/2021 11:07 AM    CO2 25 10/07/2021 11:07 AM    Anion gap 7 10/07/2021 11:07 AM    Glucose 210 (H) 10/07/2021 11:07 AM    BUN 24 (H) 10/07/2021 11:07 AM    Creatinine 1.27 (H) 10/07/2021 11:07 AM    BUN/Creatinine ratio 19 10/07/2021 11:07 AM    GFR est AA 50 (L) 10/07/2021 11:07 AM    GFR est non-AA 41 (L) 10/07/2021 11:07 AM    Calcium 9.4 10/07/2021 11:07 AM     Magnesium   Date Value Ref Range Status   09/13/2021 1.5 (L) 1.6 - 2.4 mg/dL Final   05/18/2021 2.0 1.6 - 2.4 mg/dL Final   11/13/2020 1.5 (L) 1.6 - 2.3 mg/dL Final   08/12/2020 1.6 1.6 - 2.3 mg/dL Final 06/10/2020 1.9 1.6 - 2.3 mg/dL Final     Diabetes. Sugars controlled well, running, high 90's to low 100's. Hypoglycemia: none   Tolerating current treatment so/so,   Current medications include metformin ER 500mg daily only (fixed the GI upset),  Jardiance 10mg/d, Tradjenta 5mg daily, and was supposed to be taking amaryl 1mg daily PRN for sugars >180, but has been taking that regularly anyway, didn't realize it was supposed to be PRN. Lab Results   Component Value Date/Time    Hemoglobin A1c 8.0 (H) 10/07/2021 11:07 AM    Hemoglobin A1c 8.3 (H) 05/18/2021 09:55 AM    Hemoglobin A1c 8.3 (H) 11/13/2020 10:57 AM    Glucose 210 (H) 10/07/2021 11:07 AM    Glucose (POC) 168 (H) 07/22/2020 06:25 AM    Microalbumin/Creat ratio (mg/g creat) 54 (H) 10/07/2021 11:07 AM    Microalbumin,urine random 3.37 10/07/2021 11:07 AM    Microalbumin/creat ratio (POC) <30 05/30/2018 08:26 AM    LDL, calculated 34.2 05/18/2021 09:55 AM    Creatinine 1.27 (H) 10/07/2021 11:07 AM     Last eye exam performed April 2018 w/ Dr. Alton Reyes, no DR. Pt still due to see. Plans to see Dr. Angelo Fagan. Hyperlipidemia and ASCVD  On crestor 20. Imani well. No myalgias, arthralgias, unusual weakness. [place] [address] [facility] maybe soon. Pt will check. Lab Results   Component Value Date/Time    Cholesterol, total 137 05/18/2021 09:55 AM    HDL Cholesterol 34 05/18/2021 09:55 AM    LDL, calculated 34.2 05/18/2021 09:55 AM    VLDL, calculated 68.8 05/18/2021 09:55 AM    Triglyceride 344 (H) 05/18/2021 09:55 AM    CHOL/HDL Ratio 4.0 05/18/2021 09:55 AM     Lab Results   Component Value Date/Time    ALT (SGPT) 24 10/07/2021 11:07 AM    Alk. phosphatase 64 10/07/2021 11:07 AM    Bilirubin, direct 0.10 04/18/2016 08:36 AM    Bilirubin, total 0.3 10/07/2021 11:07 AM     Syncope  No sx since last visit    PMH, SH, Medications/Allergies: reviewed, on chart.   Current Outpatient Medications   Medication Sig    metFORMIN ER (GLUCOPHAGE XR) 500 mg tablet TAKE 1 TABLETS BY MOUTH DAILY FOR Sugar diabetes    carvediloL (COREG) 3.125 mg tablet Take 1 Tablet by mouth two (2) times daily (with meals). Indications: pressure and heart    pantoprazole (PROTONIX) 40 mg tablet Take 1 Tablet by mouth daily.  rosuvastatin (CRESTOR) 20 mg tablet TAKE 1 TABLET BY MOUTH NIGHTLY FOR HEART    valsartan (DIOVAN) 160 mg tablet Take 1 Tablet by mouth two (2) times a day. Indications: pressure, replaces losartan    magnesium oxide (MAG-OX) 400 mg tablet TAKE 1 TABLET BY MOUTH THREE TIMES DAILY    lancets (OneTouch Delica Plus Lancet) 33 gauge misc Dx:e11.9, not on insulin; check sugar daily as directed.  glimepiride (AMARYL) 1 mg tablet TAKE 1 TABLET BY MOUTH ONCE DAILY IN THE MORNING AS NEEDED FOR  SUGAR  GREATER  THAN  200    linaGLIPtin (Tradjenta) 5 mg tablet Take 1 Tablet by mouth daily. Indications: sugar diabetes    cyanocobalamin 1,000 mcg tablet Take 1,000 mcg by mouth daily.  empagliflozin (Jardiance) 10 mg tablet Take 1 Tablet by mouth daily. Indications: sugar    lancets (OneTouch Delica Plus Lancet) 33 gauge misc Dx:e11.9, not on insulin; check sugar daily as directed.  spironolactone-hydrochlorothiazide (ALDACTAZIDE) 25-25 mg per tablet Take 0.5 Tablets by mouth daily. Indications: pressure and heart    glucose blood VI test strips (OneTouch Ultra Blue Test Strip) strip Dx:e11.9, not on insulin; check sugar daily    nitroglycerin (NITROSTAT) 0.4 mg SL tablet 1 Tab by SubLINGual route every five (5) minutes as needed for Chest Pain for up to 3 doses. Up to 3 doses.  clopidogreL (PLAVIX) 75 mg tab TAKE 1 TABLET BY MOUTH ONCE DAILY FOR HEART    isosorbide mononitrate ER (IMDUR) 30 mg tablet Take 1 tablet by mouth once daily    aspirin (ASPIRIN) 325 mg tablet Take 1 Tab by mouth daily.     lancets (One Touch Delica) 33 gauge misc USE 1 LANCET EACH TIME TO TEST BLOOD SUGAR DAILY AS DIRECTED Dx:e11.9, not on insulin    lancets (One Touch Delica) 33 gauge misc USE 1 LANCET EACH TIME TO TEST BLOOD SUGAR DAILY AS DIRECTED Dx:e11.9, not on insulin    Blood-Glucose Meter monitoring kit Use to check sugar daily as directed for type 2 diabetes, not on insulin    Calcium Carbonate-Vit D3-Min 600 mg calcium- 400 unit tab Take  by mouth.  multivitamin (ONE A DAY) tablet Take 1 tablet by mouth daily. No current facility-administered medications for this visit. ROS:  Constitutional: No fever, chills or abnormal weight loss  Respiratory: No cough, SOB   CV: No chest pain or Palpitations    VS review:  Visit Vitals  BP (!) 150/68 (BP 1 Location: Left arm)   Pulse 97   Temp 97.3 °F (36.3 °C) (Temporal)   Resp 22   Ht 5' 5\" (1.651 m)   Wt 160 lb 9.6 oz (72.8 kg)   SpO2 98%   BMI 26.73 kg/m²   -5#  Wt Readings from Last 3 Encounters:   01/18/22 160 lb 9.6 oz (72.8 kg)   08/10/21 165 lb 9.6 oz (75.1 kg)   08/12/20 165 lb 12.8 oz (75.2 kg)     BP Readings from Last 3 Encounters:   01/18/22 (!) 150/68   08/10/21 (!) 144/76   08/12/20 140/72     Objective:     General: alert, cooperative, no distress   Mental  status: mental status: alert, oriented to person, place, and time, normal mood, behavior, speech, dress, motor activity, and thought processes   Resp: resp: normal effort and no respiratory distress   Neuro: neuro: no gross deficits         Assessment & Plan:     HTN and hx hypomag++  Pressure still a little high. Just not getting along well with metoprolol, wants to try changing that. Interested in trying Atenolol. Con't valsartan 160mg BID,  aldactazide 12/12mg daily, and con't imdur 30mg/d. Monitor BP's, con't to monitor Mg++, check Mg++ and BMP today. L pontine stroke. Con't rehab, ASA 325mg daily. Had alert re: cross reaction with brilinta, but hasn't had any issue with plavix, so con't plavix 75mg daily, BP control (as above), lipid control. HLD/CHD  Con't Crestor and BP mgmt. Labs in goal Spring 2021.  Con't to work on [employer], ASA 325mg daily, plavix 75mg daily. Recheck labs ordered. DM2  No spells of hypoglycemia lately. Last A1c a little up, but doing a lot better with jardiance, metformin 500mg/d, and Tradjenta 5mg/d. Check labs, adj PRN. HCM:  Ex-Smoker. Encouraged to stay quit. Flu shot UTD. FIT kit pending, has kit, just needs to collect sample and drop it off. F/U 3mo/PRN    ______________________________________________________________________    Alyssa Purvis is a 66 y.o. female and presents for annual Medicare Wellness Visit. Problem List: Reviewed with patient and discussed risk factors. Patient Active Problem List   Diagnosis Code    H/O total hip arthroplasty Z96.649    GERD (gastroesophageal reflux disease) K21.9    OA (osteoarthritis) M19.90    Hyperlipidemia E78.5    CHD (coronary heart disease) I25.10    Advance directive discussed with patient Z70.80    History of DVT of lower extremity Z86.718    Type 2 diabetes with nephropathy ([facility] [address] E11.21    History of MI (myocardial infarction) I25.2    Cerebrovascular accident (CVA) due to thrombosis of left posterior cerebral artery ([facility] [address] C08.237    Ex-smoker Z87.891    Essential hypertension I10    History of CVA (cerebrovascular accident) Z80.78    Coronary artery disease with stable angina pectoris ([facility] [address] I25.118       1. Have you been to the ER, urgent care clinic since your last visit? Hospitalized since your last visit? No    2. Have you seen or consulted any other health care providers outside of the [address] [place] [address] since your last visit? Include any pap smears or colon screening. No    Current medical providers:  Patient Care Team:  Brian Amezquita MD as PCP - General (Family Medicine)  Brian Amezquita MD as PCP - [facility]  Princess Colón MD (Cardiology)    [facility], Medications/Allergies: reviewed, on chart. Female Alcohol Screening:   On any occasion during the past 3 months, have you had more than 3 drinks containing alcohol? No    Do you average more than 7 drinks per week? No    ROS:  Constitutional: No fever, chills or weight loss  Respiratory: No cough, SOB   CV: No chest pain or Palpitations    Objective:  Visit Vitals  BP (!) 150/68 (BP 1 Location: Left arm)   Pulse 97   Temp 97.3 °F (36.3 °C) (Temporal)   Resp 22   Ht 5' 5\" (1.651 m)   Wt 160 lb 9.6 oz (72.8 kg)   SpO2 98%   BMI 26.73 kg/m²    Body mass index is 26.73 kg/m². Assessment of cognitive impairment: Alert and oriented x 3  Mini-co Clock, 2/3 recall    Depression Screen:   3 most recent PHQ Screens 2022   PHQ Not Done -   Little interest or pleasure in doing things Not at all   Feeling down, depressed, irritable, or hopeless Not at all   Total Score PHQ 2 0     Depression screening performed and reviewed with patient for 8-15 minutes    Fall Risk Assessment:    Fall Risk Assessment, last 12 mths 2022   Able to walk? Yes   Fall in past 12 months? 0   Do you feel unsteady? 0   Are you worried about falling 0   Is TUG test greater than 12 seconds? -   Is the gait abnormal? -   Number of falls in past 12 months -   Fall with injury? -       Functional Ability:   Does the patient exhibit a steady gait?  no   How long did it take the patient to get up and walk from a sitting position? 4 sec   Is the patient self reliant?  (ie can do own laundry, meals, household chores)  yes     Does the patient handle his/her own medications? yes     Does the patient handle his/her own money? yes     Is the patients home safe (ie good lighting, handrails on stairs and bath, etc.)? yes     Did you notice or did patient express any hearing difficulties? no     Did you notice or did patient express any vision difficulties? no       Advance Care Planning:   Patient was offered the opportunity to discuss advance care planning:  yes     Does patient have an Advance Directive:  yes   If no, did you provide information on Caring Connections?   yes Plan:    Has FIT kit. Reminded to drop that by. Orders Placed This Encounter    Depression Screen Annual    MAGNESIUM    HEMOGLOBIN A1C WITH EAG    METABOLIC PANEL, BASIC    OCCULT BLOOD IMMUNOASSAY,DIAGNOSTIC    metFORMIN ER (GLUCOPHAGE XR) 500 mg tablet    carvediloL (COREG) 3.125 mg tablet    pantoprazole (PROTONIX) 40 mg tablet    rosuvastatin (CRESTOR) 20 mg tablet    valsartan (DIOVAN) 160 mg tablet       Health Maintenance   Topic Date Due    Shingrix Vaccine Age 49> (1 of 2) Never done    Colorectal Cancer Screening Combo  2019    COVID-19 Vaccine (3 - Booster for Moderna series) 10/11/2021    Lipid Screen  2022    Foot Exam Q1  08/10/2022    Eye Exam Retinal or Dilated  2022    Breast Cancer Screen Mammogram  2022    A1C test (Diabetic or Prediabetic)  10/07/2022    MICROALBUMIN Q1  10/07/2022    Medicare Yearly Exam  2023    DTaP/Tdap/Td series (2 - Tdap) 2024    Hepatitis C Screening  Completed    Bone Densitometry (Dexa) Screening  Completed    Flu Vaccine  Completed    Pneumococcal 65+ years  Completed       *Patient verbalized understanding and agreement with the plan. A copy of the After Visit Summary with personalized health plan was given to the patient today. Patient advised that that will be a visit charge for services in addition to [employer]     Identified pt with two pt identifiers(name and ). Reviewed record in preparation for visit and have obtained necessary documentation.     Symptom review:    NO  Fever   NO  Shaking chills  NO  Cough  NO Headaches  NO  Body aches  NO  Coughing up blood  NO  Chest congestion  NO  Chest pain  NO  Shortness of breath  NO  Profound Loss of smell/taste  NO  Nausea/Vomiting   NO  Loose stool/Diarrhea  NO  any skin issues

## 2022-01-19 LAB
ANION GAP SERPL CALC-SCNC: 8 MMOL/L (ref 5–15)
BUN SERPL-MCNC: 28 MG/DL (ref 6–20)
BUN/CREAT SERPL: 21 (ref 12–20)
CALCIUM SERPL-MCNC: 9.9 MG/DL (ref 8.5–10.1)
CHLORIDE SERPL-SCNC: 107 MMOL/L (ref 97–108)
CO2 SERPL-SCNC: 23 MMOL/L (ref 21–32)
CREAT SERPL-MCNC: 1.33 MG/DL (ref 0.55–1.02)
EST. AVERAGE GLUCOSE BLD GHB EST-MCNC: 157 MG/DL
GLUCOSE SERPL-MCNC: 193 MG/DL (ref 65–100)
HBA1C MFR BLD: 7.1 % (ref 4–5.6)
MAGNESIUM SERPL-MCNC: 2 MG/DL (ref 1.6–2.4)
POTASSIUM SERPL-SCNC: 4 MMOL/L (ref 3.5–5.1)
SODIUM SERPL-SCNC: 138 MMOL/L (ref 136–145)

## 2022-03-08 ENCOUNTER — HOSPITAL ENCOUNTER (EMERGENCY)
Age: 72
Discharge: HOME | End: 2022-03-08
Attending: EMERGENCY MEDICINE
Payer: MEDICARE

## 2022-03-08 ENCOUNTER — TELEPHONE (OUTPATIENT)
Dept: FAMILY MEDICINE CLINIC | Age: 72
End: 2022-03-08

## 2022-03-08 VITALS
SYSTOLIC BLOOD PRESSURE: 121 MMHG | WEIGHT: 156 LBS | RESPIRATION RATE: 24 BRPM | HEIGHT: 64 IN | BODY MASS INDEX: 26.63 KG/M2 | TEMPERATURE: 97.9 F | HEART RATE: 62 BPM | OXYGEN SATURATION: 100 % | DIASTOLIC BLOOD PRESSURE: 45 MMHG

## 2022-03-08 DIAGNOSIS — N30.00 ACUTE CYSTITIS WITHOUT HEMATURIA: ICD-10-CM

## 2022-03-08 DIAGNOSIS — R55 NEAR SYNCOPE: Primary | ICD-10-CM

## 2022-03-08 LAB
ALBUMIN SERPL-MCNC: 4 G/DL (ref 3.5–5)
ALBUMIN/GLOB SERPL: 1.1 {RATIO} (ref 1.1–2.2)
ALP SERPL-CCNC: 64 U/L (ref 45–117)
ALT SERPL-CCNC: 20 U/L (ref 12–78)
ANION GAP SERPL CALC-SCNC: 11 MMOL/L (ref 5–15)
APPEARANCE UR: CLEAR
AST SERPL-CCNC: 13 U/L (ref 15–37)
BACTERIA URNS QL MICRO: ABNORMAL /HPF
BASOPHILS # BLD: 0.1 K/UL (ref 0–0.1)
BASOPHILS NFR BLD: 0 % (ref 0–1)
BILIRUB SERPL-MCNC: 0.4 MG/DL (ref 0.2–1)
BILIRUB UR QL: NEGATIVE
BUN SERPL-MCNC: 27 MG/DL (ref 6–20)
BUN/CREAT SERPL: 19 (ref 12–20)
CALCIUM SERPL-MCNC: 9.6 MG/DL (ref 8.5–10.1)
CHLORIDE SERPL-SCNC: 102 MMOL/L (ref 97–108)
CO2 SERPL-SCNC: 24 MMOL/L (ref 21–32)
COLOR UR: ABNORMAL
CREAT SERPL-MCNC: 1.43 MG/DL (ref 0.55–1.02)
DIFFERENTIAL METHOD BLD: ABNORMAL
EOSINOPHIL # BLD: 0.2 K/UL (ref 0–0.4)
EOSINOPHIL NFR BLD: 2 % (ref 0–7)
EPITH CASTS URNS QL MICRO: ABNORMAL /LPF
ERYTHROCYTE [DISTWIDTH] IN BLOOD BY AUTOMATED COUNT: 13.1 % (ref 11.5–14.5)
GLOBULIN SER CALC-MCNC: 3.8 G/DL (ref 2–4)
GLUCOSE BLD STRIP.AUTO-MCNC: 167 MG/DL (ref 65–117)
GLUCOSE SERPL-MCNC: 163 MG/DL (ref 65–100)
GLUCOSE UR STRIP.AUTO-MCNC: NEGATIVE MG/DL
HCT VFR BLD AUTO: 39.7 % (ref 35–47)
HGB BLD-MCNC: 13.5 G/DL (ref 11.5–16)
HGB UR QL STRIP: ABNORMAL
IMM GRANULOCYTES # BLD AUTO: 0 K/UL (ref 0–0.04)
IMM GRANULOCYTES NFR BLD AUTO: 0 % (ref 0–0.5)
KETONES UR QL STRIP.AUTO: NEGATIVE MG/DL
LEUKOCYTE ESTERASE UR QL STRIP.AUTO: ABNORMAL
LYMPHOCYTES # BLD: 1.6 K/UL (ref 0.8–3.5)
LYMPHOCYTES NFR BLD: 14 % (ref 12–49)
MCH RBC QN AUTO: 29.9 PG (ref 26–34)
MCHC RBC AUTO-ENTMCNC: 34 G/DL (ref 30–36.5)
MCV RBC AUTO: 87.8 FL (ref 80–99)
MONOCYTES # BLD: 0.7 K/UL (ref 0–1)
MONOCYTES NFR BLD: 6 % (ref 5–13)
NEUTS SEG # BLD: 9.1 K/UL (ref 1.8–8)
NEUTS SEG NFR BLD: 78 % (ref 32–75)
NITRITE UR QL STRIP.AUTO: NEGATIVE
NRBC # BLD: 0 K/UL (ref 0–0.01)
NRBC BLD-RTO: 0 PER 100 WBC
PH UR STRIP: 6 [PH] (ref 5–8)
PLATELET # BLD AUTO: 311 K/UL (ref 150–400)
PMV BLD AUTO: 9.5 FL (ref 8.9–12.9)
POTASSIUM SERPL-SCNC: 4.5 MMOL/L (ref 3.5–5.1)
PROT SERPL-MCNC: 7.8 G/DL (ref 6.4–8.2)
PROT UR STRIP-MCNC: NEGATIVE MG/DL
RBC # BLD AUTO: 4.52 M/UL (ref 3.8–5.2)
RBC #/AREA URNS HPF: ABNORMAL /HPF (ref 0–5)
SERVICE CMNT-IMP: ABNORMAL
SODIUM SERPL-SCNC: 137 MMOL/L (ref 136–145)
SP GR UR REFRACTOMETRY: 1.02 (ref 1–1.03)
UROBILINOGEN UR QL STRIP.AUTO: 0.2 EU/DL (ref 0.2–1)
WBC # BLD AUTO: 11.7 K/UL (ref 3.6–11)
WBC URNS QL MICRO: ABNORMAL /HPF (ref 0–4)

## 2022-03-08 PROCEDURE — 74011250636 HC RX REV CODE- 250/636: Performed by: EMERGENCY MEDICINE

## 2022-03-08 PROCEDURE — 80053 COMPREHEN METABOLIC PANEL: CPT

## 2022-03-08 PROCEDURE — 74011250637 HC RX REV CODE- 250/637: Performed by: EMERGENCY MEDICINE

## 2022-03-08 PROCEDURE — 82962 GLUCOSE BLOOD TEST: CPT

## 2022-03-08 PROCEDURE — 99284 EMERGENCY DEPT VISIT MOD MDM: CPT

## 2022-03-08 PROCEDURE — 36415 COLL VENOUS BLD VENIPUNCTURE: CPT

## 2022-03-08 PROCEDURE — 93005 ELECTROCARDIOGRAM TRACING: CPT

## 2022-03-08 PROCEDURE — 81001 URINALYSIS AUTO W/SCOPE: CPT

## 2022-03-08 PROCEDURE — 85025 COMPLETE CBC W/AUTO DIFF WBC: CPT

## 2022-03-08 RX ORDER — CEPHALEXIN 500 MG/1
500 CAPSULE ORAL 3 TIMES DAILY
Qty: 21 CAPSULE | Refills: 0 | Status: SHIPPED | OUTPATIENT
Start: 2022-03-08 | End: 2022-03-15

## 2022-03-08 RX ORDER — SODIUM CHLORIDE 0.9 % (FLUSH) 0.9 %
5-10 SYRINGE (ML) INJECTION ONCE
Status: DISCONTINUED | OUTPATIENT
Start: 2022-03-08 | End: 2022-03-08 | Stop reason: HOSPADM

## 2022-03-08 RX ORDER — CEPHALEXIN 250 MG/1
500 CAPSULE ORAL
Status: COMPLETED | OUTPATIENT
Start: 2022-03-08 | End: 2022-03-08

## 2022-03-08 RX ADMIN — SODIUM CHLORIDE 1000 ML: 9 INJECTION, SOLUTION INTRAVENOUS at 15:22

## 2022-03-08 RX ADMIN — CEPHALEXIN 500 MG: 250 CAPSULE ORAL at 15:39

## 2022-03-08 NOTE — TELEPHONE ENCOUNTER
Patients daughter-in-law called and stated the patient passed out today during a chair yoga workout. She fell out of the chair, lost control of her bowels. Patient sugar is high and she was given a glimepiride and a magnesium pill. She took shower and states she feels much better. PCP advised patient to go to ER and get a workup. Patient advised and she stated she is going to ER.

## 2022-03-08 NOTE — ED TRIAGE NOTES
Pt arrived by POV for check up. Per pt she was sent to the ER by Dr. Dalton Edwards after pts daughter in law called his office. Pt reports she was a chair yoga this AM and while she was doing an exercise she \"had a spell\" , felt sweaty and EMS was called,  Pt was checked out by EMS for which she was told her vitals were fine but her sugar was elevated. Pt reports she went home and took her medications for her sugar and a shower because she did lose her bowels which happens sometimes since her stroke. Pt reports she is fine and has no complaints but come to the ER because she was told to by Dr. Dalton Edwards. Pt now is reporting that she did pass out.   Pt is awake alert and oriented x 4, pt educated on ER flow

## 2022-03-08 NOTE — ED NOTES
Pt pain assessed. Offered use of restroom and assistance as necessary. Pt declined. Pt offered repositioning in bed for comfort. Assessed pt's ability to access possessions, everything within reach of pt. Pt reminded to use call bell as necessary, report any changes in status. Pt thanked for allowing care. Bed locked in lowest position, side rails up as necessary.  IV bolus infused talking with family

## 2022-03-08 NOTE — ED PROVIDER NOTES
EMERGENCY DEPARTMENT HISTORY AND PHYSICAL EXAM          Date: 3/8/2022  Patient Name: Carmelita Cash    History of Presenting Illness     Chief Complaint   Patient presents with    Fatigue     x1 today while doing yoga exercises. Became sweaty. Seen by EMS and released declined transfer. Other members there stated she passed out. History Provided By: Patient    HPI: Carmelita Cash is a 66 y.o. female, pmhx htn, dm, pituitary microadenoma, who presents ambulatory to the ED c/o fatigue    Patient explains she was sitting in her chair yoga class at the [facility] with her arms out to the side holding a position when she became sweaty and lightheaded. She notes she had a bowel movement in the middle class and they moved her to a new chair put cold cloths over her and called 911. She states the people are with her called 911 because he thought she passed out but patient states she remains awake and remembers the whole event. When EMS arrived they noted her vital signs to be normal and her blood sugar to be over 200 and so she did not feel that she needs to go to the hospital because she had recently seen cardiologist who told her everything looked good. She went home, took a shower, glyburide because her blood sugar was over 200 and was resting when the nurse from her primary care doctor's office called her. They had heard from someone in town that this event occurred at her yoga class and recommended she come to the emergency room. Patient states that this time she feels normal she is not having any nausea, does not feel lightheaded and is not having any chest pain, shortness of breath, coughing, abdominal pain and has not been having any vomiting vomiting or diarrhea. She states there is no blood in her bowel movement today and she did not have abdominal pain before this bowel movement. PCP: Milo Gutierrez MD    Allergies: multiple  Social Hx: -tobacco, -vaping, -EtOH, -Illicit Drugs;     There are no other complaints, changes, or physical findings at this time. Current Facility-Administered Medications   Medication Dose Route Frequency Provider Last Rate Last Admin    sodium chloride (NS) flush 5-10 mL  5-10 mL IntraVENous ONCE Lucrecia Rosas MD         Current Outpatient Medications   Medication Sig Dispense Refill    cephALEXin (Keflex) 500 mg capsule Take 1 Capsule by mouth three (3) times daily for 7 days. 21 Capsule 0    metFORMIN ER (GLUCOPHAGE XR) 500 mg tablet TAKE 2 TABLETS BY MOUTH ONCE DAILY FOR  SUGAR 180 Tablet 3    glucose blood VI test strips (ASCENSIA AUTODISC VI, ONE TOUCH ULTRA TEST VI) strip Dx:e11.9, not on insulin; check sugar daily 100 Strip 3    carvediloL (COREG) 3.125 mg tablet Take 1 Tablet by mouth two (2) times daily (with meals). Indications: pressure and heart 60 Tablet 6    pantoprazole (PROTONIX) 40 mg tablet Take 1 Tablet by mouth daily. 90 Tablet 3    rosuvastatin (CRESTOR) 20 mg tablet TAKE 1 TABLET BY MOUTH NIGHTLY FOR HEART 90 Tablet 3    valsartan (DIOVAN) 160 mg tablet Take 1 Tablet by mouth two (2) times a day. Indications: pressure, replaces losartan 180 Tablet 3    magnesium oxide (MAG-OX) 400 mg tablet TAKE 1 TABLET BY MOUTH THREE TIMES DAILY 90 Tablet 3    lancets (OneTouch Delica Plus Lancet) 33 gauge misc Dx:e11.9, not on insulin; check sugar daily as directed. 100 Each 3    glimepiride (AMARYL) 1 mg tablet TAKE 1 TABLET BY MOUTH ONCE DAILY IN THE MORNING AS NEEDED FOR  SUGAR  GREATER  THAN  200 30 Tablet 1    linaGLIPtin (Tradjenta) 5 mg tablet Take 1 Tablet by mouth daily. Indications: sugar diabetes 90 Tablet 3    cyanocobalamin 1,000 mcg tablet Take 1,000 mcg by mouth daily.  empagliflozin (Jardiance) 10 mg tablet Take 1 Tablet by mouth daily. Indications: sugar 30 Tablet 11    lancets (OneTouch Delica Plus Lancet) 33 gauge misc Dx:e11.9, not on insulin; check sugar daily as directed.  100 Lancet 3    spironolactone-hydrochlorothiazide (ALDACTAZIDE) 25-25 mg per tablet Take 0.5 Tablets by mouth daily. Indications: pressure and heart 45 Tablet 3    nitroglycerin (NITROSTAT) 0.4 mg SL tablet 1 Tab by SubLINGual route every five (5) minutes as needed for Chest Pain for up to 3 doses. Up to 3 doses. 20 Tab 2    clopidogreL (PLAVIX) 75 mg tab TAKE 1 TABLET BY MOUTH ONCE DAILY FOR HEART 90 Tab 3    isosorbide mononitrate ER (IMDUR) 30 mg tablet Take 1 tablet by mouth once daily 90 Tab 3    aspirin (ASPIRIN) 325 mg tablet Take 1 Tab by mouth daily. 100 Tab 3    lancets (One Touch Delica) 33 gauge misc USE 1 LANCET EACH TIME TO TEST BLOOD SUGAR DAILY AS DIRECTED Dx:e11.9, not on insulin 100 Lancet 3    lancets (One Touch Delica) 33 gauge misc USE 1 LANCET EACH TIME TO TEST BLOOD SUGAR DAILY AS DIRECTED Dx:e11.9, not on insulin 100 Lancet 3    Blood-Glucose Meter monitoring kit Use to check sugar daily as directed for type 2 diabetes, not on insulin 1 Kit 0    Calcium Carbonate-Vit D3-Min 600 mg calcium- 400 unit tab Take  by mouth.  multivitamin (ONE A DAY) tablet Take 1 tablet by mouth daily.          Past History     Past Medical History:  Past Medical History:   Diagnosis Date    [facility] [address]     Basal cell carcinoma     eye lid    Chronic headaches     DM2 (diabetes mellitus, type 2) (HCC)     Fibrocystic breast disease     Hypertension     Mechanical back pain     Menopause     Pituitary microadenoma ([facility] [address] 3/4/2019    Found on MRI for stroke eval 3/2019       Past Surgical History:  Past Surgical History:   Procedure Laterality Date    HX HIP REPLACEMENT      left       Family History:  Family History   Problem Relation Age of Onset    Hypertension Father     Heart Disease Father     Diabetes Father        Social History:  Social History     Tobacco Use    Smoking status: Former Smoker     Packs/day: 1.00     Years: 35.00     Pack years: 35.00     Types: Cigarettes    Smokeless tobacco: Never Used   Vaping Use    Vaping Use: Never used   Substance Use Topics    Alcohol use: No     Alcohol/week: 0.0 standard drinks    Drug use: No       Allergies: Allergies   Allergen Reactions    Sulfa (Sulfonamide Antibiotics) Hives    Ticagrelor Rash    Lipitor [Atorvastatin] Rash     Phototoxic reaction         Review of Systems   Review of Systems   Constitutional: Negative for activity change, appetite change, chills, fever and unexpected weight change. HENT: Negative for congestion. Eyes: Negative for pain and visual disturbance. Respiratory: Negative for cough and shortness of breath. Cardiovascular: Negative for chest pain. Gastrointestinal: Negative for abdominal pain, diarrhea, nausea and vomiting. Genitourinary: Negative for dysuria. Musculoskeletal: Negative for back pain. Skin: Negative for rash. Neurological: Positive for light-headedness. Negative for headaches. Physical Exam   Physical Exam  Vitals and nursing note reviewed. Constitutional:       Appearance: She is well-developed. She is not diaphoretic. Comments: This is a healthy appearing elderly female, with normal vital signs, in minimal acute distress. Patient is awake and alert and able to follow commands without difficulty   HENT:      Head: Normocephalic and atraumatic. Eyes:      General:         Right eye: No discharge. Left eye: No discharge. Conjunctiva/sclera: Conjunctivae normal.      Pupils: Pupils are equal, round, and reactive to light. Cardiovascular:      Rate and Rhythm: Normal rate and regular rhythm. Heart sounds: Normal heart sounds. No murmur heard. Pulmonary:      Effort: Pulmonary effort is normal. No respiratory distress. Breath sounds: Normal breath sounds. No wheezing or rales. Abdominal:      General: Bowel sounds are normal. There is no distension. Palpations: Abdomen is soft. Tenderness: There is no abdominal tenderness. Musculoskeletal:         General: Normal range of motion. Cervical back: Normal range of motion and neck supple. Right lower leg: No edema. Left lower leg: No edema. Skin:     General: Skin is warm and dry. Capillary Refill: Capillary refill takes less than 2 seconds. Findings: No rash. Neurological:      General: No focal deficit present. Mental Status: She is alert and oriented to person, place, and time. Cranial Nerves: No cranial nerve deficit. Motor: No weakness or abnormal muscle tone.       Coordination: Coordination normal.       Diagnostic Study Results     Labs -     Recent Results (from the past 12 hour(s))   GLUCOSE, POC    Collection Time: 03/08/22  2:56 PM   Result Value Ref Range    Glucose (POC) 167 (H) 65 - 117 mg/dL    Performed by Cristina MCNAIR)    URINALYSIS W/ RFLX MICROSCOPIC    Collection Time: 03/08/22  3:10 PM   Result Value Ref Range    Color YELLOW/STRAW      Appearance CLEAR CLEAR      Specific gravity 1.020 1.003 - 1.030      pH (UA) 6.0 5.0 - 8.0      Protein Negative NEG mg/dL    Glucose Negative NEG mg/dL    Ketone Negative NEG mg/dL    Bilirubin Negative NEG      Blood TRACE (A) NEG      Urobilinogen 0.2 0.2 - 1.0 EU/dL    Nitrites Negative NEG      Leukocyte Esterase TRACE (A) NEG     URINE MICROSCOPIC ONLY    Collection Time: 03/08/22  3:10 PM   Result Value Ref Range    WBC 5-10 0 - 4 /hpf    RBC 5-10 0 - 5 /hpf    Epithelial cells MODERATE (A) FEW /lpf    Bacteria 1+ (A) NEG /hpf   CBC WITH AUTOMATED DIFF    Collection Time: 03/08/22  3:19 PM   Result Value Ref Range    WBC 11.7 (H) 3.6 - 11.0 K/uL    RBC 4.52 3.80 - 5.20 M/uL    HGB 13.5 11.5 - 16.0 g/dL    HCT 39.7 35.0 - 47.0 %    MCV 87.8 80.0 - 99.0 FL    MCH 29.9 26.0 - 34.0 PG    MCHC 34.0 30.0 - 36.5 g/dL    RDW 13.1 11.5 - 14.5 %    PLATELET 699 898 - 025 K/uL    MPV 9.5 8.9 - 12.9 FL    NRBC 0.0 0  WBC    ABSOLUTE NRBC 0.00 0.00 - 0.01 K/uL    NEUTROPHILS 78 (H) 32 - 75 %    LYMPHOCYTES 14 12 - 49 %    MONOCYTES 6 5 - 13 %    EOSINOPHILS 2 0 - 7 %    BASOPHILS 0 0 - 1 %    IMMATURE GRANULOCYTES 0 0.0 - 0.5 %    ABS. NEUTROPHILS 9.1 (H) 1.8 - 8.0 K/UL    ABS. LYMPHOCYTES 1.6 0.8 - 3.5 K/UL    ABS. MONOCYTES 0.7 0.0 - 1.0 K/UL    ABS. EOSINOPHILS 0.2 0.0 - 0.4 K/UL    ABS. BASOPHILS 0.1 0.0 - 0.1 K/UL    ABS. IMM. GRANS. 0.0 0.00 - 0.04 K/UL    DF AUTOMATED     EKG, 12 LEAD, INITIAL    Collection Time: 03/08/22  3:28 PM   Result Value Ref Range    Ventricular Rate 58 BPM    Atrial Rate 58 BPM    P-R Interval 152 ms    QRS Duration 62 ms    Q-T Interval 448 ms    QTC Calculation (Bezet) 439 ms    Calculated P Axis 41 degrees    Calculated R Axis 50 degrees    Calculated T Axis 60 degrees    Diagnosis       Sinus bradycardia  Otherwise normal ECG  When compared with ECG of 21-JUL-2020 12:51,  No significant change was found         Radiologic Studies -   No orders to display     CT Results  (Last 48 hours)    None        CXR Results  (Last 48 hours)    None            Medical Decision Making   I am the first provider for this patient. I reviewed the vital signs, available nursing notes, past medical history, past surgical history, family history and social history. Vital Signs-Reviewed the patient's vital signs.   Patient Vitals for the past 12 hrs:   Temp Pulse Resp BP SpO2   03/08/22 1519  64 15  98 %   03/08/22 1518  74 16 114/60 97 %   03/08/22 1516  64 14 (!) 123/56 98 %   03/08/22 1513  65 19 138/64 98 %   03/08/22 1501  63 13 127/65 96 %   03/08/22 1458  66 20  99 %   03/08/22 1443    (!) 141/61    03/08/22 1442 97.9 °F (36.6 °C) 69 16 (!) 141/61 97 %       Pulse Oximetry Analysis - 99% on RA    Cardiac Monitor:   Rate: 69bpm  Rhythm: Normal Sinus Rhythm      Records Reviewed: Nursing Notes, Old Medical Records, Previous electrocardiograms, Previous Radiology Studies and Previous Laboratory Studies    Provider Notes (Medical Decision Making):   MDM: Elderly female presenting from home after a near syncopal episode while interiorly today. Patient states she remembers the whole event and does not think she passed out but it is documented by the nursing staff here in the ER but she admitted passing out; she does not admit that to me. EKG, labs, urinalysis with POC glucose to be obtained. ED Course:   Initial assessment performed. The patients presenting problems have been discussed, and they are in agreement with the care plan formulated and outlined with them. I have encouraged them to ask questions as they arise throughout their visit. EKG interpretation: (Preliminary)  Rhythm: Sinus bradycardia at a rate of 50 bpm; normal TN; normal QRS; normal QTC and normal axis. Normal EKG. This EKG was interpreted by ED Provider Melissa Henderson MD    PROGRESS NOTE:  3:42 PM   Pt continues to do well with family sitting at bedside. Orthostatics are unremarkable, EKG nonconcerning and CBC unremarkable. Cardiac monitoring remains normal.  Urinalysis noted for white cells with 1+ bacteria and trace leuk esterase but there are epithelial cells. Discussed with patient we will go ahead and start Keflex with recommendations to return for any worsening signs of infection. Chemistries pending. Discharge note:  Pt re-evaluated and feeling well, ready for discharge. Updated pt on all final lab findings. Will follow up as instructed. All questions have been answered, pt voiced understanding and agreement with plan. Specific return precautions provided as well as instructions to return to the ED should sx worsen at any time. Vital signs stable for discharge. Critical Care Time:   0      Diagnosis     Clinical Impression:   1. Near syncope    2. Acute cystitis without hematuria        PLAN:  1.    Current Discharge Medication List      START taking these medications    Details   cephALEXin (Keflex) 500 mg capsule Take 1 Capsule by mouth three (3) times daily for 7 days. Qty: 21 Capsule, Refills: 0  Start date: 3/8/2022, End date: 3/15/2022           2. Follow-up Information     Follow up With Specialties Details Why Contact Yamilka Hanson MD Family Medicine Schedule an appointment as soon as possible for a visit   [place] [id number]  [phone]      [address] [facility] [address] [facility] Emergency Medicine  If symptoms worsen [address] [id number]        Return to ED if worse     Disposition:  Home       Please note, this dictation was completed with [employer] the [employer] voice recognition software. Quite often unanticipated grammatical, syntax, homophones, and other interpretive errors are inadvertently transcribed by the computer software. Please disregard these errors. Please excuse any errors that have escaped final proof reading.

## 2022-03-08 NOTE — DISCHARGE INSTRUCTIONS
You were seen in the ER for possible syncopal episode, which means you may have passed out today during her yoga class. Your cardiac monitoring, EKG and labs are all normal.  Your urine shows that you are developing an infection. I have sent a prescription to your pharmacy which she will need to take tonight before bed.

## 2022-03-08 NOTE — ED NOTES
I have reviewed discharge instructions with the patient and caregiver. The patient and caregiver/granddaughter verbalized understanding. Discharge medications discussed with patient. No questions at this time. Ambulated out without difficulty.

## 2022-03-09 LAB
ATRIAL RATE: 58 BPM
CALCULATED P AXIS, ECG09: 41 DEGREES
CALCULATED R AXIS, ECG10: 50 DEGREES
CALCULATED T AXIS, ECG11: 60 DEGREES
DIAGNOSIS, 93000: NORMAL
P-R INTERVAL, ECG05: 152 MS
Q-T INTERVAL, ECG07: 448 MS
QRS DURATION, ECG06: 62 MS
QTC CALCULATION (BEZET), ECG08: 439 MS
VENTRICULAR RATE, ECG03: 58 BPM

## 2022-03-10 ENCOUNTER — TELEPHONE (OUTPATIENT)
Dept: FAMILY MEDICINE CLINIC | Age: 72
End: 2022-03-10

## 2022-03-10 NOTE — TELEPHONE ENCOUNTER
[facility] [employer] contacted requesting further documentation for medical necessity need. Reports faxed to [phone].

## 2022-03-14 ENCOUNTER — OFFICE VISIT (OUTPATIENT)
Dept: FAMILY MEDICINE CLINIC | Age: 72
End: 2022-03-14
Payer: MEDICARE

## 2022-03-14 VITALS
HEART RATE: 72 BPM | RESPIRATION RATE: 16 BRPM | DIASTOLIC BLOOD PRESSURE: 58 MMHG | TEMPERATURE: 97.9 F | OXYGEN SATURATION: 98 % | WEIGHT: 155 LBS | SYSTOLIC BLOOD PRESSURE: 118 MMHG | HEIGHT: 64 IN | BODY MASS INDEX: 26.46 KG/M2

## 2022-03-14 DIAGNOSIS — I63.332 CEREBROVASCULAR ACCIDENT (CVA) DUE TO THROMBOSIS OF LEFT POSTERIOR CEREBRAL ARTERY (HCC): ICD-10-CM

## 2022-03-14 DIAGNOSIS — D35.2 PITUITARY ADENOMA (HCC): Primary | ICD-10-CM

## 2022-03-14 DIAGNOSIS — I10 ESSENTIAL HYPERTENSION: ICD-10-CM

## 2022-03-14 DIAGNOSIS — E11.21 TYPE 2 DIABETES WITH NEPHROPATHY (HCC): ICD-10-CM

## 2022-03-14 DIAGNOSIS — I25.10 CORONARY ARTERY DISEASE INVOLVING NATIVE CORONARY ARTERY OF NATIVE HEART WITHOUT ANGINA PECTORIS: ICD-10-CM

## 2022-03-14 DIAGNOSIS — I25.118 CORONARY ARTERY DISEASE OF NATIVE ARTERY OF NATIVE HEART WITH STABLE ANGINA PECTORIS (HCC): ICD-10-CM

## 2022-03-14 PROCEDURE — 99214 OFFICE O/P EST MOD 30 MIN: CPT | Performed by: FAMILY MEDICINE

## 2022-03-14 RX ORDER — CARVEDILOL 3.12 MG/1
3.12 TABLET ORAL 2 TIMES DAILY WITH MEALS
Qty: 180 TABLET | Refills: 3 | Status: SHIPPED | OUTPATIENT
Start: 2022-03-14

## 2022-03-14 NOTE — Clinical Note
Please make sure that her carotid doppler gets ore with central scheduling at [facility], I think I put [facility] on it, but not sure. Thanks!

## 2022-03-14 NOTE — PROGRESS NOTES
Chief Complaint   Patient presents with    Other     ER follow-up UTI, near syncope episode while during chair yoda last week     1. Have you been to the ER, urgent care clinic since your last visit? Hospitalized since your last visit? No    2. Have you seen or consulted any other health care providers outside of the [address] [place] [address] since your last visit? Include any pap smears or colon screening. No   Pain level 0    Lili Fernandez is a 66 y.o. female     Subjective:     HPI:  Pt here for followup for syncope. Patient passed out 3/8/22 during a chair yoga workout. She fell out of the chair, lost control of her bowels. Patient checked by EMS and blood sugar was high. Given Amaryl per orders and a magnesium pill. Sx resolved. PCP advised patient to go to ER and get a workup. In ER, had check. Labs showed signs of UTI, no anemia, reassuring EKG, VS, and electrolytes. GFR a touch lower at 36 (was 39 with bump in BUN). Rec to hydrate more aggressively. Since then, has not had further sx. Chart review shows past hx of non-obst carotid atherosclerosis bilat. Also has hx of small pituitary adenoma that has been stable on past couple of scans. Echo 7/2020 during similar episode was benign. Prev followed by [facility], last Holter 2019 with PSVT. F/U L basilar CVA  At last visit we con't current regimen. Remains on ASA and plavix, statin and BP meds, but having issues with b-blocker as below    Hypertension. Blood pressures up last visit, so we tried con't current tx and adding jardiance to help with sugar. We chaned metoprolol ER 25mg AM to coreg 3.125mg BID last visit, feeling a lot better on that. Current regimen: ARB, beta-blocker, thiazide aldactone, nitrate. Symptoms include fatigue. No palpitations with changing the b-blocker dose, but pulse up a little. Patient denies chest pain, dyspnea. Echo 7/22/20 was overall reassuring, with EF 55-60%.  Last labs looked good but Mg was sl low, so we boosted the dose a notch. Lab review:   Lab Results   Component Value Date/Time    Sodium 137 03/08/2022 03:19 PM    Potassium 4.5 03/08/2022 03:19 PM    Chloride 102 03/08/2022 03:19 PM    CO2 24 03/08/2022 03:19 PM    Anion gap 11 03/08/2022 03:19 PM    Glucose 163 (H) 03/08/2022 03:19 PM    BUN 27 (H) 03/08/2022 03:19 PM    Creatinine 1.43 (H) 03/08/2022 03:19 PM    BUN/Creatinine ratio 19 03/08/2022 03:19 PM    GFR est AA 44 (L) 03/08/2022 03:19 PM    GFR est non-AA 36 (L) 03/08/2022 03:19 PM    Calcium 9.6 03/08/2022 03:19 PM     Magnesium   Date Value Ref Range Status   01/18/2022 2.0 1.6 - 2.4 mg/dL Final   09/13/2021 1.5 (L) 1.6 - 2.4 mg/dL Final   05/18/2021 2.0 1.6 - 2.4 mg/dL Final   11/13/2020 1.5 (L) 1.6 - 2.3 mg/dL Final   08/12/2020 1.6 1.6 - 2.3 mg/dL Final     Diabetes. Sugars controlled well, running, high 90's to low 100's. Hypoglycemia: none   Tolerating current treatment so/so,   Current medications include metformin ER 500mg daily only (fixed the GI upset),  Jardiance 10mg/d, Tradjenta 5mg daily, and was supposed to be taking amaryl 1mg daily PRN for sugars >180, but has been taking that regularly anyway, didn't realize it was supposed to be PRN. Lab Results   Component Value Date/Time    Hemoglobin A1c 7.1 (H) 01/18/2022 09:40 AM    Hemoglobin A1c 8.0 (H) 10/07/2021 11:07 AM    Hemoglobin A1c 8.3 (H) 05/18/2021 09:55 AM    Glucose 163 (H) 03/08/2022 03:19 PM    Glucose (POC) 167 (H) 03/08/2022 02:56 PM    Microalbumin/Creat ratio (mg/g creat) 54 (H) 10/07/2021 11:07 AM    Microalbumin,urine random 3.37 10/07/2021 11:07 AM    Microalbumin/creat ratio (POC) <30 05/30/2018 08:26 AM    LDL, calculated 34.2 05/18/2021 09:55 AM    Creatinine 1.43 (H) 03/08/2022 03:19 PM     Last eye exam performed April 2018 w/ Dr. Sharif, no DR. Pt still due to see. Plans to see Dr. Jose Antonio Galan. Hyperlipidemia and ASCVD  On crestor 20. Imani well. No myalgias, arthralgias, unusual weakness.  Seeing Dr. Fe Walker at [facility] maybe soon. Pt will check. Lab Results   Component Value Date/Time    Cholesterol, total 137 05/18/2021 09:55 AM    HDL Cholesterol 34 05/18/2021 09:55 AM    LDL, calculated 34.2 05/18/2021 09:55 AM    VLDL, calculated 68.8 05/18/2021 09:55 AM    Triglyceride 344 (H) 05/18/2021 09:55 AM    CHOL/HDL Ratio 4.0 05/18/2021 09:55 AM     Lab Results   Component Value Date/Time    ALT (SGPT) 20 03/08/2022 03:19 PM    Alk. phosphatase 64 03/08/2022 03:19 PM    Bilirubin, direct 0.10 04/18/2016 08:36 AM    Bilirubin, total 0.4 03/08/2022 03:19 PM       PMH, SH, Medications/Allergies: reviewed, on chart. Current Outpatient Medications   Medication Sig    cephALEXin (Keflex) 500 mg capsule Take 1 Capsule by mouth three (3) times daily for 7 days.  metFORMIN ER (GLUCOPHAGE XR) 500 mg tablet TAKE 2 TABLETS BY MOUTH ONCE DAILY FOR  SUGAR    glucose blood VI test strips (ASCENSIA AUTODISC VI, ONE TOUCH ULTRA TEST VI) strip Dx:e11.9, not on insulin; check sugar daily    carvediloL (COREG) 3.125 mg tablet Take 1 Tablet by mouth two (2) times daily (with meals). Indications: pressure and heart    pantoprazole (PROTONIX) 40 mg tablet Take 1 Tablet by mouth daily.  rosuvastatin (CRESTOR) 20 mg tablet TAKE 1 TABLET BY MOUTH NIGHTLY FOR HEART    valsartan (DIOVAN) 160 mg tablet Take 1 Tablet by mouth two (2) times a day. Indications: pressure, replaces losartan    magnesium oxide (MAG-OX) 400 mg tablet TAKE 1 TABLET BY MOUTH THREE TIMES DAILY    lancets (OneTouch Delica Plus Lancet) 33 gauge misc Dx:e11.9, not on insulin; check sugar daily as directed.  glimepiride (AMARYL) 1 mg tablet TAKE 1 TABLET BY MOUTH ONCE DAILY IN THE MORNING AS NEEDED FOR  SUGAR  GREATER  THAN  200    linaGLIPtin (Tradjenta) 5 mg tablet Take 1 Tablet by mouth daily. Indications: sugar diabetes    cyanocobalamin 1,000 mcg tablet Take 1,000 mcg by mouth daily.  empagliflozin (Jardiance) 10 mg tablet Take 1 Tablet by mouth daily. Indications: sugar    lancets (OneTouch Delica Plus Lancet) 33 gauge misc Dx:e11.9, not on insulin; check sugar daily as directed.  spironolactone-hydrochlorothiazide (ALDACTAZIDE) 25-25 mg per tablet Take 0.5 Tablets by mouth daily. Indications: pressure and heart    nitroglycerin (NITROSTAT) 0.4 mg SL tablet 1 Tab by SubLINGual route every five (5) minutes as needed for Chest Pain for up to 3 doses. Up to 3 doses.  clopidogreL (PLAVIX) 75 mg tab TAKE 1 TABLET BY MOUTH ONCE DAILY FOR HEART    isosorbide mononitrate ER (IMDUR) 30 mg tablet Take 1 tablet by mouth once daily    aspirin (ASPIRIN) 325 mg tablet Take 1 Tab by mouth daily.  lancets (One Touch Delica) 33 gauge misc USE 1 LANCET EACH TIME TO TEST BLOOD SUGAR DAILY AS DIRECTED Dx:e11.9, not on insulin    lancets (One Touch Delica) 33 gauge misc USE 1 LANCET EACH TIME TO TEST BLOOD SUGAR DAILY AS DIRECTED Dx:e11.9, not on insulin    Blood-Glucose Meter monitoring kit Use to check sugar daily as directed for type 2 diabetes, not on insulin    Calcium Carbonate-Vit D3-Min 600 mg calcium- 400 unit tab Take  by mouth.  multivitamin (ONE A DAY) tablet Take 1 tablet by mouth daily. No current facility-administered medications for this visit.      ROS:  Constitutional: No fever, chills or abnormal weight loss  Respiratory: No cough, SOB   CV: No chest pain or Palpitations    VS review:  Visit Vitals  BP (!) 118/58 (BP 1 Location: Right arm)   Pulse 72   Temp 97.9 °F (36.6 °C) (Oral)   Resp 16   Ht 5' 4\" (1.626 m)   Wt 155 lb (70.3 kg)   SpO2 98%   BMI 26.61 kg/m²   -5#  Wt Readings from Last 3 Encounters:   03/14/22 155 lb (70.3 kg)   03/08/22 156 lb (70.8 kg)   01/18/22 160 lb 9.6 oz (72.8 kg)     BP Readings from Last 3 Encounters:   03/14/22 (!) 118/58   03/08/22 (!) 121/45   01/18/22 (!) 150/68     Objective:     Physical Examination: General appearance - alert, well appearing, and in no distress  Mental status - alert, oriented to person, place, and time  Eyes - pupils equal and reactive, extraocular eye movements intact  ENT - bilateral external ears and nose normal. Normal lips  Neck - supple, no significant adenopathy, no thyromegaly or mass  Chest - clear to auscultation, no wheezes, rales or rhonchi, symmetric air entry  Heart - normal rate, regular rhythm, normal S1, S2, no murmurs, rubs, clicks or gallops  Extremities - peripheral pulses normal, no pedal edema, no clubbing or cyanosis    Assessment & Plan:     Syncope with exertion  Second time (prev 2020). Rec pt to discuss with cardio DR. Natalia Rios or his covering provider. Check Carotid doppler to see if ASCVD has become obstructive. Check brain MRI to see if pituitary adenoma has enlarged. Not to drive P6YP or until properly cleared. HTN and hx hypomag++  Pressures good today with change to coreg, and tiffanie better. Con't current regimen. Monitor BP's. L pontine stroke. Con't rehab, ASA 325mg daily. Con't plavix 75mg daily, BP control (as above), lipid control. HLD/CHD  Con't Crestor and BP mgmt. Labs in goal Spring 2021. Con't to work on [employer], ASA 325mg daily, plavix 75mg daily. DM2  No spells of hypoglycemia lately. Last A1c pretty good. Con't jardiance, metformin, and Tradjenta at current doses. Plan recheck labs May/Jun, adj PRN. HCM:  Ex-Smoker. Encouraged to stay quit. Flu shot UTD. FIT kit still pending, has kit, just needs to collect sample and drop it off. F/U 3mo/PRN      1. \"Have you been to the ER, urgent care clinic since your last visit? Hospitalized since your last visit? \" Yes Where: [facility]    2. \"Have you seen or consulted any other health care providers outside of the [address] [place] [address] since your last visit? \" No     3. For patients aged 39-70: Has the patient had a colonoscopy / FIT/ Cologuard? No      If the patient is female:    4. For patients aged 41-77: Has the patient had a mammogram within the past 2 years?  Yes - no Care Gap present      5. For patients aged 21-65: Has the patient had a pap smear? NA - based on age or sex      Identified pt with two pt identifiers(name and ). Reviewed record in preparation for visit and have obtained necessary documentation.     Symptom review:    NO  Fever   NO  Shaking chills  NO  Cough  NO Headaches  NO  Body aches  NO  Coughing up blood  NO  Chest congestion  NO  Chest pain  NO  Shortness of breath  NO  Profound Loss of smell/taste  NO  Nausea/Vomiting   NO  Loose stool/Diarrhea  NO  any skin issues

## 2022-03-18 PROBLEM — Z86.718 HISTORY OF DVT OF LOWER EXTREMITY: Status: ACTIVE | Noted: 2018-05-30

## 2022-03-19 PROBLEM — I10 ESSENTIAL HYPERTENSION: Status: ACTIVE | Noted: 2020-07-21

## 2022-03-19 PROBLEM — Z86.73 HISTORY OF CVA (CEREBROVASCULAR ACCIDENT): Status: ACTIVE | Noted: 2020-07-21

## 2022-03-19 PROBLEM — I63.332 CEREBROVASCULAR ACCIDENT (CVA) DUE TO THROMBOSIS OF LEFT POSTERIOR CEREBRAL ARTERY (HCC): Status: ACTIVE | Noted: 2019-03-05

## 2022-03-19 PROBLEM — Z87.891 EX-SMOKER: Status: ACTIVE | Noted: 2019-08-02

## 2022-03-19 PROBLEM — I25.118 CORONARY ARTERY DISEASE WITH STABLE ANGINA PECTORIS (HCC): Status: ACTIVE | Noted: 2020-08-12

## 2022-03-20 PROBLEM — E11.21 TYPE 2 DIABETES WITH NEPHROPATHY (HCC): Status: ACTIVE | Noted: 2018-11-28

## 2022-03-20 PROBLEM — I25.2 HISTORY OF MI (MYOCARDIAL INFARCTION): Status: ACTIVE | Noted: 2019-03-04

## 2022-03-23 ENCOUNTER — HOSPITAL ENCOUNTER (OUTPATIENT)
Dept: ULTRASOUND IMAGING | Age: 72
Discharge: HOME | End: 2022-03-23
Attending: FAMILY MEDICINE
Payer: MEDICARE

## 2022-03-23 ENCOUNTER — TELEPHONE (OUTPATIENT)
Dept: FAMILY MEDICINE CLINIC | Age: 72
End: 2022-03-23

## 2022-03-23 ENCOUNTER — HOSPITAL ENCOUNTER (OUTPATIENT)
Dept: MRI IMAGING | Age: 72
Discharge: HOME | End: 2022-03-23
Attending: FAMILY MEDICINE
Payer: MEDICARE

## 2022-03-23 DIAGNOSIS — D35.2 PITUITARY ADENOMA (HCC): ICD-10-CM

## 2022-03-23 DIAGNOSIS — I63.332 CEREBROVASCULAR ACCIDENT (CVA) DUE TO THROMBOSIS OF LEFT POSTERIOR CEREBRAL ARTERY (HCC): ICD-10-CM

## 2022-03-23 LAB
LEFT CCA DIST DIAS: 12.8 CM/S
LEFT CCA DIST SYS: 71.1 CM/S
LEFT CCA PROX DIAS: 15.3 CM/S
LEFT CCA PROX SYS: 74.8 CM/S
LEFT ECA DIAS: 12.6 CM/S
LEFT ECA SYS: 184.8 CM/S
LEFT ICA DIST DIAS: 21.6 CM/S
LEFT ICA DIST SYS: 67.9 CM/S
LEFT ICA MID DIAS: 13.4 CM/S
LEFT ICA MID SYS: 41 CM/S
LEFT ICA PROX DIAS: 11 CM/S
LEFT ICA PROX SYS: 33.9 CM/S
LEFT ICA/CCA SYS: 0.96
LEFT VERTEBRAL DIAS: 9.22 CM/S
LEFT VERTEBRAL SYS: 35.3 CM/S
RIGHT CCA DIST DIAS: 20.7 CM/S
RIGHT CCA DIST SYS: 75.5 CM/S
RIGHT CCA PROX DIAS: 14.2 CM/S
RIGHT CCA PROX SYS: 63.8 CM/S
RIGHT ECA DIAS: 8.37 CM/S
RIGHT ECA SYS: 116.2 CM/S
RIGHT ICA DIST DIAS: 25.9 CM/S
RIGHT ICA DIST SYS: 79.9 CM/S
RIGHT ICA MID DIAS: 24.1 CM/S
RIGHT ICA MID SYS: 72.6 CM/S
RIGHT ICA PROX DIAS: 14.8 CM/S
RIGHT ICA PROX SYS: 55.8 CM/S
RIGHT ICA/CCA SYS: 1.1
RIGHT VERTEBRAL DIAS: 25.95 CM/S
RIGHT VERTEBRAL SYS: 85.9 CM/S

## 2022-03-23 PROCEDURE — A9576 INJ PROHANCE MULTIPACK: HCPCS | Performed by: FAMILY MEDICINE

## 2022-03-23 PROCEDURE — 74011250636 HC RX REV CODE- 250/636: Performed by: FAMILY MEDICINE

## 2022-03-23 PROCEDURE — 93880 EXTRACRANIAL BILAT STUDY: CPT

## 2022-03-23 PROCEDURE — 70553 MRI BRAIN STEM W/O & W/DYE: CPT

## 2022-03-23 RX ADMIN — GADOTERIDOL 15 ML: 279.3 INJECTION, SOLUTION INTRAVENOUS at 09:23

## 2022-03-24 ENCOUNTER — TELEPHONE (OUTPATIENT)
Dept: FAMILY MEDICINE CLINIC | Age: 72
End: 2022-03-24

## 2022-03-24 NOTE — TELEPHONE ENCOUNTER
----- Message from Mer Reyes sent at 3/24/2022  7:56 AM EDT -----  Subject: Results Request    QUESTIONS  Which lab or imaging result is the patient calling about? MRI  Which provider ordered the test? Sandra Neves   At what location was the test performed? Date the test was performed? 2022-03-23  Additional Information for Provider? Pt wanted to inform the  that she   did complete her test and would like to know the results. Please all the   pt back. ---------------------------------------------------------------------------  --------------  Noa CAMEJO  What is the best way for the office to contact you? OK to leave message on   voicemail  Preferred Call Back Phone Number?  [phone]

## 2022-05-12 ENCOUNTER — TELEPHONE (OUTPATIENT)
Dept: FAMILY MEDICINE CLINIC | Age: 72
End: 2022-05-12

## 2022-05-12 NOTE — TELEPHONE ENCOUNTER
----- Message from Joana [id number] sent at 5/12/2022 12:18 PM EDT -----  Subject: Message to Provider    QUESTIONS  Information for Provider? Patient is calling to say that she had her heart   monitor results to be sent over to doctor Khushub Cool she wants to make sure   that the doctor got them, patient would like a call back. ---------------------------------------------------------------------------  --------------  Rebecca CAMEJO  What is the best way for the office to contact you? OK to leave message on   voicemail  Preferred Call Back Phone Number? [phone]  ---------------------------------------------------------------------------  --------------  SCRIPT ANSWERS  Relationship to Patient?  Self

## 2022-05-13 NOTE — TELEPHONE ENCOUNTER
Contacted pt. Reviewed findings. Reviewed event monitor report sent by [facility]. 1 mo monitor wear. Has appt late May with [facility] for echo, still seeing Alexandra Sitnson for now. Of note, the loose stool has resolved with d/c the metformin. Sugars running mid 100's. Monitor.

## 2022-06-01 ENCOUNTER — OFFICE VISIT (OUTPATIENT)
Dept: FAMILY MEDICINE CLINIC | Age: 72
End: 2022-06-01
Payer: MEDICARE

## 2022-06-01 VITALS
TEMPERATURE: 97.5 F | HEIGHT: 64 IN | HEART RATE: 84 BPM | DIASTOLIC BLOOD PRESSURE: 72 MMHG | SYSTOLIC BLOOD PRESSURE: 120 MMHG | WEIGHT: 156 LBS | BODY MASS INDEX: 26.63 KG/M2

## 2022-06-01 DIAGNOSIS — R55 SYNCOPE AND COLLAPSE: Primary | ICD-10-CM

## 2022-06-01 DIAGNOSIS — I63.332 CEREBROVASCULAR ACCIDENT (CVA) DUE TO THROMBOSIS OF LEFT POSTERIOR CEREBRAL ARTERY (HCC): ICD-10-CM

## 2022-06-01 DIAGNOSIS — I25.10 CORONARY ARTERY DISEASE INVOLVING NATIVE CORONARY ARTERY OF NATIVE HEART WITHOUT ANGINA PECTORIS: ICD-10-CM

## 2022-06-01 DIAGNOSIS — Z12.11 COLON CANCER SCREENING: ICD-10-CM

## 2022-06-01 DIAGNOSIS — N18.32 STAGE 3B CHRONIC KIDNEY DISEASE (HCC): ICD-10-CM

## 2022-06-01 DIAGNOSIS — E11.21 TYPE 2 DIABETES WITH NEPHROPATHY (HCC): ICD-10-CM

## 2022-06-01 DIAGNOSIS — I10 ESSENTIAL HYPERTENSION: ICD-10-CM

## 2022-06-01 PROBLEM — N18.30 CHRONIC RENAL DISEASE, STAGE III (HCC): Status: ACTIVE | Noted: 2022-06-01

## 2022-06-01 PROCEDURE — 99214 OFFICE O/P EST MOD 30 MIN: CPT | Performed by: FAMILY MEDICINE

## 2022-06-01 NOTE — PROGRESS NOTES
Chief Complaint   Patient presents with    Cerebrovascular Accident    Hypertension     1. Have you been to the ER, urgent care clinic since your last visit? Hospitalized since your last visit? No    2. Have you seen or consulted any other health care providers outside of the [address] [place] [address] since your last visit? Include any pap smears or colon screening. No   Pain level 0    Dwaine Monzon is a 74 y.o. female     Subjective:     HPI:  Pt here for followup for syncope. Patient passed out 3/8/22 during a chair yoga workout. She fell out of the chair, lost control of her bowels. Patient checked by EMS and blood sugar was high. Given Amaryl per orders and a magnesium pill. Sx resolved. PCP advised patient to go to ER and get a workup. In ER, had check. Labs showed signs of UTI, no anemia, reassuring EKG, VS, and electrolytes. GFR a touch lower at 36 (was 39 with bump in BUN). Rec to hydrate more aggressively. Since then, has not had further sx. Chart review shows past hx of non-obst carotid atherosclerosis bilat, rpt Carotid doppler shows no changes. Pituitary adenoma also unchanged on most recent scan 3/2022 . Last Echo 7/2020 during similar episode was benign. Followed by [facility], last Holter 2019 with PSVT. Just finished new event monitor. Pending read. F/U L basilar CVA  At last visit we con't current regimen. Remains on ASA and plavix, statin and BP meds. No further issues with b-blocker as below    Hypertension. Blood pressures good since last visit, no further major SE to BP regimen: ARB, beta-blocker, thiazide aldactone, nitrate. Symptoms include no sx. Patient denies chest pain, palpitations, dyspnea. Echo 7/22/20 was overall reassuring, with EF 55-60%. Last labs looked good, gFR low but stable.     Lab review:   Lab Results   Component Value Date/Time    Sodium 137 03/08/2022 03:19 PM    Potassium 4.5 03/08/2022 03:19 PM    Chloride 102 03/08/2022 03:19 PM    CO2 24 03/08/2022 03:19 PM Anion gap 11 03/08/2022 03:19 PM    Glucose 163 (H) 03/08/2022 03:19 PM    BUN 27 (H) 03/08/2022 03:19 PM    Creatinine 1.43 (H) 03/08/2022 03:19 PM    BUN/Creatinine ratio 19 03/08/2022 03:19 PM    GFR est AA 44 (L) 03/08/2022 03:19 PM    GFR est non-AA 36 (L) 03/08/2022 03:19 PM    Calcium 9.6 03/08/2022 03:19 PM     Magnesium   Date Value Ref Range Status   01/18/2022 2.0 1.6 - 2.4 mg/dL Final   09/13/2021 1.5 (L) 1.6 - 2.4 mg/dL Final   05/18/2021 2.0 1.6 - 2.4 mg/dL Final   11/13/2020 1.5 (L) 1.6 - 2.3 mg/dL Final   08/12/2020 1.6 1.6 - 2.3 mg/dL Final     Diabetes. Sugars controlled well, running, high 90's to low 100's. Hypoglycemia: none   Tolerating current treatment so/so,   Current medications include metformin ER 500mg daily only (fixed the GI upset),  Jardiance 10mg/d, Tradjenta 5mg daily, and was supposed to be taking amaryl 1mg daily PRN for sugars >180, but has been taking that regularly anyway, didn't realize it was supposed to be PRN. Lab Results   Component Value Date/Time    Hemoglobin A1c 7.1 (H) 01/18/2022 09:40 AM    Hemoglobin A1c 8.0 (H) 10/07/2021 11:07 AM    Hemoglobin A1c 8.3 (H) 05/18/2021 09:55 AM    Glucose 163 (H) 03/08/2022 03:19 PM    Glucose (POC) 167 (H) 03/08/2022 02:56 PM    Microalbumin/Creat ratio (mg/g creat) 54 (H) 10/07/2021 11:07 AM    Microalbumin,urine random 3.37 10/07/2021 11:07 AM    Microalbumin/creat ratio (POC) <30 05/30/2018 08:26 AM    LDL, calculated 34.2 05/18/2021 09:55 AM    Creatinine 1.43 (H) 03/08/2022 03:19 PM     Last eye exam performed April 2018 w/ Dr. Paola Hernandez, no DR. Pt still due to see. Plans to see Dr. Akin Gale. Hyperlipidemia and ASCVD  On crestor 20. Imani well. No myalgias, arthralgias, unusual weakness. Seeing Dr. Maria G [place] [address] [facility] maybe soon. Pt will check.   Lab Results   Component Value Date/Time    Cholesterol, total 137 05/18/2021 09:55 AM    HDL Cholesterol 34 05/18/2021 09:55 AM    LDL, calculated 34.2 05/18/2021 09:55 AM VLDL, calculated 68.8 05/18/2021 09:55 AM    Triglyceride 344 (H) 05/18/2021 09:55 AM    CHOL/HDL Ratio 4.0 05/18/2021 09:55 AM     Lab Results   Component Value Date/Time    ALT (SGPT) 20 03/08/2022 03:19 PM    Alk. phosphatase 64 03/08/2022 03:19 PM    Bilirubin, direct 0.10 04/18/2016 08:36 AM    Bilirubin, total 0.4 03/08/2022 03:19 PM       PMH, SH, Medications/Allergies: reviewed, on chart. Current Outpatient Medications   Medication Sig    clopidogreL (PLAVIX) 75 mg tab TAKE 1 TABLET BY MOUTH ONCE DAILY FOR HEART    magnesium oxide (MAG-OX) 400 mg tablet TAKE 1 TABLET BY MOUTH THREE TIMES DAILY    isosorbide mononitrate ER (IMDUR) 30 mg tablet Take 1 tablet by mouth once daily    metFORMIN ER (GLUCOPHAGE XR) 500 mg tablet HELD    carvediloL (COREG) 3.125 mg tablet Take 1 Tablet by mouth two (2) times daily (with meals). Indications: pressure and heart    empagliflozin (Jardiance) 10 mg tablet Take 1 Tablet by mouth daily. Indications: sugar    glucose blood VI test strips (ASCENSIA AUTODISC VI, ONE TOUCH ULTRA TEST VI) strip Dx:e11.9, not on insulin; check sugar daily    pantoprazole (PROTONIX) 40 mg tablet Take 1 Tablet by mouth daily.  rosuvastatin (CRESTOR) 20 mg tablet TAKE 1 TABLET BY MOUTH NIGHTLY FOR HEART    valsartan (DIOVAN) 160 mg tablet Take 1 Tablet by mouth two (2) times a day. Indications: pressure, replaces losartan    lancets (OneTouch Delica Plus Lancet) 33 gauge misc Dx:e11.9, not on insulin; check sugar daily as directed.  glimepiride (AMARYL) 1 mg tablet TAKE 1 TABLET BY MOUTH ONCE DAILY IN THE MORNING AS NEEDED FOR  SUGAR  GREATER  THAN  200    linaGLIPtin (Tradjenta) 5 mg tablet Take 1 Tablet by mouth daily. Indications: sugar diabetes    cyanocobalamin 1,000 mcg tablet Take 1,000 mcg by mouth daily.  lancets (OneTouch Delica Plus Lancet) 33 gauge misc Dx:e11.9, not on insulin; check sugar daily as directed.     spironolactone-hydrochlorothiazide (ALDACTAZIDE) 25-25 mg per tablet Take 0.5 Tablets by mouth daily. Indications: pressure and heart    nitroglycerin (NITROSTAT) 0.4 mg SL tablet 1 Tab by SubLINGual route every five (5) minutes as needed for Chest Pain for up to 3 doses. Up to 3 doses.  aspirin (ASPIRIN) 325 mg tablet Take 1 Tab by mouth daily.  lancets (One Touch Delica) 33 gauge misc USE 1 LANCET EACH TIME TO TEST BLOOD SUGAR DAILY AS DIRECTED Dx:e11.9, not on insulin    lancets (One Touch Delica) 33 gauge misc USE 1 LANCET EACH TIME TO TEST BLOOD SUGAR DAILY AS DIRECTED Dx:e11.9, not on insulin    Blood-Glucose Meter monitoring kit Use to check sugar daily as directed for type 2 diabetes, not on insulin    Calcium Carbonate-Vit D3-Min 600 mg calcium- 400 unit tab Take  by mouth.  multivitamin (ONE A DAY) tablet Take 1 tablet by mouth daily. No current facility-administered medications for this visit.      ROS:  Constitutional: No fever, chills or abnormal weight loss  Respiratory: No cough, SOB   CV: No chest pain or Palpitations    VS review:  Visit Vitals  /72 (BP 1 Location: Left arm)   Pulse 84   Temp 97.5 °F (36.4 °C) (Temporal)   Ht 5' 4\" (1.626 m)   Wt 156 lb (70.8 kg)   BMI 26.78 kg/m²     Wt Readings from Last 3 Encounters:   06/01/22 156 lb (70.8 kg)   03/14/22 155 lb (70.3 kg)   03/08/22 156 lb (70.8 kg)     BP Readings from Last 3 Encounters:   06/01/22 120/72   03/14/22 (!) 118/58   03/08/22 (!) 121/45     Objective:     Physical Examination: General appearance - alert, well appearing, and in no distress  Mental status - alert, oriented to person, place, and time  Eyes - pupils equal and reactive, extraocular eye movements intact  ENT - bilateral external ears and nose normal. Normal lips  Neck - supple, no significant adenopathy, no thyromegaly or mass  Chest - clear to auscultation, no wheezes, rales or rhonchi, symmetric air entry  Heart - normal rate, regular rhythm, normal S1, S2, no murmurs, rubs, clicks or gallops  Extremities - peripheral pulses normal, no pedal edema, no clubbing or cyanosis    Assessment & Plan:     Syncope with exertion  Second time (prev 2020). Pt had event monitor over past month. On prelim, had a couple spells by report of fast beats lasting a few seconds, but nothing sustained, and was asyx the whole time. Hasn't had formal read yet. Dr. Armand Hammans or his covering provider. Carotid doppler showed non-obst ASCVD still, unchanged, Brain MRI was reassuring, no change to pituitary and no new CVA. Still on driving restriction until 9/8/22 or until cleared by cardio. HTN and hx hypomag++  Pressures good today. Con't current regimen. Monitor BP's. Plan rpt labs at 9/2022 visit. L pontine stroke. Con't rehab, ASA 325mg daily. Con't plavix 75mg daily, BP control (as above), lipid control. HLD/CHD  Con't Crestor and BP mgmt. Labs in goal Spring 2021, plan recheck at 9/2022 visit. Con't to work on [employer], ASA 325mg daily, plavix 75mg daily. DM2  No spells of hypoglycemia lately. Last A1c pretty good. Con't jardiance, metformin, and Tradjenta at current doses. Plan recheck labs at [address] visit and adj PRN. Can complete pt assistance forms for Jardiance on request.    HCM:  Ex-Smoker. Encouraged to stay quit. Plan discuss low dose CT for lung ca screening at followup. Flu shot UTD. Covid x2 done. Plan discuss shingrix at followup. Colon ca screening. FIT kit still pending, has kit, just needs to collect sample and drop it off.     F/U 3mo/PRN

## 2022-06-01 NOTE — PROGRESS NOTES
Visit Vitals  /72 (BP 1 Location: Left arm)   Pulse 84   Temp 97.5 °F (36.4 °C) (Temporal)   Ht 5' 4\" (1.626 m)   Wt 156 lb (70.8 kg)   BMI 26.78 kg/m²     Chief Complaint   Patient presents with    Cerebrovascular Accident    Hypertension     1. Have you been to the ER, urgent care clinic since your last visit? Hospitalized since your last visit? No    2. Have you seen or consulted any other health care providers outside of the [address] [place] [address] since your last visit? Include any pap smears or colon screening.  No

## 2022-07-20 DIAGNOSIS — E11.21 TYPE 2 DIABETES WITH NEPHROPATHY (HCC): Primary | ICD-10-CM

## 2022-07-20 RX ORDER — BLOOD SUGAR DIAGNOSTIC
STRIP MISCELLANEOUS
Qty: 100 STRIP | Refills: 3 | Status: SHIPPED | OUTPATIENT
Start: 2022-07-20

## 2023-01-16 DIAGNOSIS — I25.118 CORONARY ARTERY DISEASE OF NATIVE ARTERY OF NATIVE HEART WITH STABLE ANGINA PECTORIS (HCC): ICD-10-CM

## 2023-01-16 DIAGNOSIS — I63.332 CEREBROVASCULAR ACCIDENT (CVA) DUE TO THROMBOSIS OF LEFT POSTERIOR CEREBRAL ARTERY (HCC): ICD-10-CM

## 2023-01-16 DIAGNOSIS — E11.21 TYPE 2 DIABETES WITH NEPHROPATHY (HCC): ICD-10-CM

## 2023-01-16 DIAGNOSIS — I25.10 CORONARY ARTERY DISEASE INVOLVING NATIVE CORONARY ARTERY OF NATIVE HEART WITHOUT ANGINA PECTORIS: ICD-10-CM

## 2023-01-16 DIAGNOSIS — I10 ESSENTIAL HYPERTENSION: ICD-10-CM

## 2023-01-16 RX ORDER — CARVEDILOL 3.12 MG/1
TABLET ORAL
Qty: 180 TABLET | Refills: 3 | Status: SHIPPED | OUTPATIENT
Start: 2023-01-16

## 2023-01-20 ENCOUNTER — HOSPITAL ENCOUNTER (EMERGENCY)
Age: 73
Discharge: HOME | End: 2023-01-20
Attending: EMERGENCY MEDICINE
Payer: MEDICARE

## 2023-01-20 VITALS
RESPIRATION RATE: 12 BRPM | TEMPERATURE: 97.6 F | HEART RATE: 63 BPM | SYSTOLIC BLOOD PRESSURE: 148 MMHG | OXYGEN SATURATION: 98 % | DIASTOLIC BLOOD PRESSURE: 72 MMHG

## 2023-01-20 DIAGNOSIS — R55 NEAR SYNCOPE: Primary | ICD-10-CM

## 2023-01-20 LAB
ALBUMIN SERPL-MCNC: 3.8 G/DL (ref 3.5–5)
ALBUMIN/GLOB SERPL: 1.1 (ref 1.1–2.2)
ALP SERPL-CCNC: 67 U/L (ref 45–117)
ALT SERPL-CCNC: 19 U/L (ref 12–78)
ANION GAP SERPL CALC-SCNC: 8 MMOL/L (ref 5–15)
APPEARANCE UR: CLEAR
AST SERPL-CCNC: 19 U/L (ref 15–37)
BACTERIA URNS QL MICRO: NEGATIVE /HPF
BASOPHILS # BLD: 0.1 K/UL (ref 0–0.1)
BASOPHILS NFR BLD: 1 % (ref 0–1)
BILIRUB SERPL-MCNC: 0.4 MG/DL (ref 0.2–1)
BILIRUB UR QL: NEGATIVE
BUN SERPL-MCNC: 23 MG/DL (ref 6–20)
BUN/CREAT SERPL: 20 (ref 12–20)
CALCIUM SERPL-MCNC: 9.4 MG/DL (ref 8.5–10.1)
CHLORIDE SERPL-SCNC: 100 MMOL/L (ref 97–108)
CO2 SERPL-SCNC: 28 MMOL/L (ref 21–32)
COLOR UR: NORMAL
CREAT SERPL-MCNC: 1.17 MG/DL (ref 0.55–1.02)
DIFFERENTIAL METHOD BLD: ABNORMAL
EOSINOPHIL # BLD: 0.1 K/UL (ref 0–0.4)
EOSINOPHIL NFR BLD: 2 % (ref 0–7)
EPITH CASTS URNS QL MICRO: NORMAL /LPF
ERYTHROCYTE [DISTWIDTH] IN BLOOD BY AUTOMATED COUNT: 12.8 % (ref 11.5–14.5)
GLOBULIN SER CALC-MCNC: 3.5 G/DL (ref 2–4)
GLUCOSE BLD STRIP.AUTO-MCNC: 181 MG/DL (ref 65–117)
GLUCOSE SERPL-MCNC: 187 MG/DL (ref 65–100)
GLUCOSE UR STRIP.AUTO-MCNC: NEGATIVE MG/DL
HCT VFR BLD AUTO: 35 % (ref 35–47)
HGB BLD-MCNC: 12.5 G/DL (ref 11.5–16)
HGB UR QL STRIP: NEGATIVE
IMM GRANULOCYTES # BLD AUTO: 0 K/UL (ref 0–0.04)
IMM GRANULOCYTES NFR BLD AUTO: 0 % (ref 0–0.5)
KETONES UR QL STRIP.AUTO: NEGATIVE MG/DL
LEUKOCYTE ESTERASE UR QL STRIP.AUTO: NEGATIVE
LYMPHOCYTES # BLD: 1.2 K/UL (ref 0.8–3.5)
LYMPHOCYTES NFR BLD: 14 % (ref 12–49)
MCH RBC QN AUTO: 30.3 PG (ref 26–34)
MCHC RBC AUTO-ENTMCNC: 35.7 G/DL (ref 30–36.5)
MCV RBC AUTO: 85 FL (ref 80–99)
MONOCYTES # BLD: 0.5 K/UL (ref 0–1)
MONOCYTES NFR BLD: 6 % (ref 5–13)
NEUTS SEG # BLD: 6.9 K/UL (ref 1.8–8)
NEUTS SEG NFR BLD: 77 % (ref 32–75)
NITRITE UR QL STRIP.AUTO: NEGATIVE
NRBC # BLD: 0 K/UL (ref 0–0.01)
NRBC BLD-RTO: 0 PER 100 WBC
PH UR STRIP: 6 (ref 5–8)
PLATELET # BLD AUTO: 298 K/UL (ref 150–400)
PMV BLD AUTO: 9.4 FL (ref 8.9–12.9)
POTASSIUM SERPL-SCNC: 4.3 MMOL/L (ref 3.5–5.1)
PROT SERPL-MCNC: 7.3 G/DL (ref 6.4–8.2)
PROT UR STRIP-MCNC: NEGATIVE MG/DL
RBC # BLD AUTO: 4.12 M/UL (ref 3.8–5.2)
RBC #/AREA URNS HPF: NORMAL /HPF (ref 0–5)
SERVICE CMNT-IMP: ABNORMAL
SODIUM SERPL-SCNC: 136 MMOL/L (ref 136–145)
SP GR UR REFRACTOMETRY: 1.01 (ref 1–1.03)
TROPONIN-HIGH SENSITIVITY: 6 NG/L (ref 0–51)
UA: UC IF INDICATED,UAUC: NORMAL
UROBILINOGEN UR QL STRIP.AUTO: 0.2 EU/DL (ref 0.2–1)
WBC # BLD AUTO: 8.8 K/UL (ref 3.6–11)
WBC URNS QL MICRO: NORMAL /HPF (ref 0–4)

## 2023-01-20 PROCEDURE — 36415 COLL VENOUS BLD VENIPUNCTURE: CPT

## 2023-01-20 PROCEDURE — 93005 ELECTROCARDIOGRAM TRACING: CPT

## 2023-01-20 PROCEDURE — 82962 GLUCOSE BLOOD TEST: CPT

## 2023-01-20 PROCEDURE — 81001 URINALYSIS AUTO W/SCOPE: CPT

## 2023-01-20 PROCEDURE — 99284 EMERGENCY DEPT VISIT MOD MDM: CPT | Performed by: EMERGENCY MEDICINE

## 2023-01-20 PROCEDURE — 80053 COMPREHEN METABOLIC PANEL: CPT

## 2023-01-20 PROCEDURE — 84484 ASSAY OF TROPONIN QUANT: CPT

## 2023-01-20 PROCEDURE — 85025 COMPLETE CBC W/AUTO DIFF WBC: CPT

## 2023-01-20 NOTE — ED TRIAGE NOTES
Episode of lightheadedness after chair yoga exercise today - resolved after a \"few minutes\" and has not returned.  No CP , n/v/d or SOB

## 2023-01-21 LAB
ATRIAL RATE: 68 BPM
CALCULATED P AXIS, ECG09: 46 DEGREES
CALCULATED R AXIS, ECG10: 60 DEGREES
CALCULATED T AXIS, ECG11: 55 DEGREES
DIAGNOSIS, 93000: NORMAL
P-R INTERVAL, ECG05: 154 MS
Q-T INTERVAL, ECG07: 430 MS
QRS DURATION, ECG06: 62 MS
QTC CALCULATION (BEZET), ECG08: 457 MS
VENTRICULAR RATE, ECG03: 68 BPM

## 2023-01-21 NOTE — ED PROVIDER NOTES
[facility] EMERGENCY DEP  EMERGENCY DEPARTMENT ENCOUNTER       Pt Name: Delmi Haynes  MRN: [id number]  [place] 1950  Date of evaluation: 1/20/2023  Provider: Helen Taylor MD   PCP: Linda Bunn DO  Note Started: 7:16 PM 1/20/23     CHIEF COMPLAINT       Chief Complaint   Patient presents with    Dizziness        HISTORY OF PRESENT ILLNESS: 1 or more elements      History From: pt, History limited by: none     Delmi Haynes is a 74 y.o. female who presents with a chief complaint of an episode of lightheadedness while doing chair yoga. 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 This lasted for several minutes and then resolved. Tells me she had a similar episode 8 months ago when she had a urinary tract infection at that time. She denies any chest pain, palpitations, shortness of breath. Has no symptoms on my evaluation. Nursing Notes were all reviewed and agreed with or any disagreements were addressed in the HPI. REVIEW OF SYSTEMS        Positives and Pertinent negatives as per HPI.     PAST HISTORY     Past Medical History:  Past Medical History:   Diagnosis Date    Acute MI ([address]     Basal cell carcinoma     eye lid    Chronic headaches     DM2 (diabetes mellitus, type 2) (HCC)     Fibrocystic breast disease     Hypertension     Mechanical back pain     Menopause     Pituitary microadenoma ([facility] [address] 3/4/2019    Found on MRI for stroke eval 3/2019       Past Surgical History:  Past Surgical History:   Procedure Laterality Date    HX HIP REPLACEMENT      left       Family History:  Family History   Problem Relation Age of Onset    Hypertension Father     Heart Disease Father     Diabetes Father        Social History:  Social History     Tobacco Use    Smoking status: Former     Packs/day: 1.00     Years: 35.00     Pack years: 35.00     Types: Cigarettes     Quit date: 2008     Years since quitting: 15.0    Smokeless tobacco: Never   Vaping Use    Vaping Use: Never used   Substance Use Topics    Alcohol use: No     Alcohol/week: 0.0 standard drinks    Drug use: No       Allergies: Allergies   Allergen Reactions    Sulfa (Sulfonamide Antibiotics) Hives    Ticagrelor Rash    Lipitor [Atorvastatin] Rash     Phototoxic reaction       CURRENT MEDICATIONS      Discharge Medication List as of 1/20/2023  5:06 PM        CONTINUE these medications which have NOT CHANGED    Details   carvediloL (COREG) 3.125 mg tablet TAKE 1 TABLET TWICE DAILY WITH MEALS FOR BLOOD PRESSURE AND HEART, Normal, Disp-180 Tablet, R-3      magnesium oxide (MAG-OX) 400 mg tablet TAKE 1 TABLET BY MOUTH THREE TIMES DAILY, Normal, Disp-90 Tablet, R-11      spironolactone-hydrochlorothiazide (ALDACTAZIDE) 25-25 mg per tablet Take 1/2 (one-half) tablet by mouth once daily, Normal, Disp-45 Tablet, R-3      glucose blood VI test strips (OneTouch Ultra Test) strip USE 1 STRIP TO CHECK GLUCOSE ONCE DAILY. Dx:e11.9, not on insulin, Normal, Disp-100 Strip, R-3      clopidogreL (PLAVIX) 75 mg tab TAKE 1 TABLET BY MOUTH ONCE DAILY FOR HEART, Normal, Disp-90 Tablet, R-3      isosorbide mononitrate ER (IMDUR) 30 mg tablet Take 1 tablet by mouth once daily, Normal, Disp-90 Tablet, R-3      metFORMIN ER (GLUCOPHAGE XR) 500 mg tablet HELD, No Print, Disp-180 Tablet, R-3      empagliflozin (Jardiance) 10 mg tablet Take 1 Tablet by mouth daily. Indications: sugar, Normal, Disp-90 Tablet, R-3      pantoprazole (PROTONIX) 40 mg tablet Take 1 Tablet by mouth daily. , Normal, Disp-90 Tablet, R-3      rosuvastatin (CRESTOR) 20 mg tablet TAKE 1 TABLET BY MOUTH NIGHTLY FOR HEART, Normal, Disp-90 Tablet, R-3      valsartan (DIOVAN) 160 mg tablet Take 1 Tablet by mouth two (2) times a day.  Indications: pressure, replaces losartan, Normal, Disp-180 Tablet, R-3      !! lancets (OneTouch Delica Plus Lancet) 33 gauge misc Dx:e11.9, not on insulin; check sugar daily as directed., Normal, Disp-100 Each, R-3      glimepiride (AMARYL) 1 mg tablet TAKE 1 TABLET BY MOUTH ONCE DAILY IN THE MORNING AS NEEDED FOR  SUGAR  GREATER  THAN  200, Normal, Disp-30 Tablet, R-1      linaGLIPtin (Tradjenta) 5 mg tablet Take 1 Tablet by mouth daily. Indications: sugar diabetes, Normal, Disp-90 Tablet, R-3      cyanocobalamin 1,000 mcg tablet Take 1,000 mcg by mouth daily. , Historical Med      !! lancets (OneTouch Delica Plus Lancet) 33 gauge misc Dx:e11.9, not on insulin; check sugar daily as directed., Normal, Disp-100 Lancet, R-3      nitroglycerin (NITROSTAT) 0.4 mg SL tablet 1 Tab by SubLINGual route every five (5) minutes as needed for Chest Pain for up to 3 doses. Up to 3 doses. , Normal, Disp-20 Tab, R-2      aspirin (ASPIRIN) 325 mg tablet Take 1 Tab by mouth daily. , No Print, Disp-100 Tab,R-3      !! lancets (One Touch Delica) 33 gauge misc USE 1 LANCET EACH TIME TO TEST BLOOD SUGAR DAILY AS DIRECTED Dx:e11.9, not on insulin, Normal, Disp-100 Lancet,R-3      !! lancets (One Touch Delica) 33 gauge misc USE 1 LANCET EACH TIME TO TEST BLOOD SUGAR DAILY AS DIRECTED Dx:e11.9, not on insulin, Normal, Disp-100 Lancet,R-3      Blood-Glucose Meter monitoring kit Use to check sugar daily as directed for type 2 diabetes, not on insulin, Normal, Disp-1 Kit,R-0      Calcium Carbonate-Vit D3-Min 600 mg calcium- 400 unit tab Take  by mouth., Historical Med      multivitamin (ONE A DAY) tablet Take 1 tablet by mouth daily. , Historical Med       !! - Potential duplicate medications found. Please discuss with provider. SCREENINGS               No data recorded         PHYSICAL EXAM      ED Triage Vitals [01/20/23 1407]   ED Encounter Vitals Group      BP (!) 130/59      Pulse (Heart Rate) 68      Resp Rate 16      Temp 97.6 °F (36.4 °C)      Temp src       O2 Sat (%) 97 %      Weight       Height         Physical Exam  Vitals and nursing note reviewed. Constitutional:       General: She is not in acute distress.      Appearance: She is well-developed. HENT:      Head: Normocephalic and atraumatic. Eyes:      Conjunctiva/sclera: Conjunctivae normal.      Pupils: Pupils are equal, round, and reactive to light. Cardiovascular:      Rate and Rhythm: Normal rate and regular rhythm. Pulmonary:      Effort: Pulmonary effort is normal. No respiratory distress. Breath sounds: Normal breath sounds. No stridor. Abdominal:      General: There is no distension. Palpations: Abdomen is soft. Tenderness: There is no abdominal tenderness. Musculoskeletal:         General: Normal range of motion. Cervical back: Normal range of motion. Skin:     General: Skin is warm and dry. Neurological:      Mental Status: She is alert and oriented to person, place, and time.    Psychiatric:         Mood and Affect: Mood normal.        DIAGNOSTIC RESULTS   LABS:     Recent Results (from the past 12 hour(s))   GLUCOSE, POC    Collection Time: 01/20/23  2:44 PM   Result Value Ref Range    Glucose (POC) 181 (H) 65 - 117 mg/dL    Performed by Marcial Morel (SRINIVAS)    EKG, 12 LEAD, INITIAL    Collection Time: 01/20/23  2:48 PM   Result Value Ref Range    Ventricular Rate 68 BPM    Atrial Rate 68 BPM    P-R Interval 154 ms    QRS Duration 62 ms    Q-T Interval 430 ms    QTC Calculation (Bezet) 457 ms    Calculated P Axis 46 degrees    Calculated R Axis 60 degrees    Calculated T Axis 55 degrees    Diagnosis       Sinus rhythm with premature atrial complexes  Otherwise normal ECG  When compared with ECG of 08-MAR-2022 15:28,  premature atrial complexes are now present     CBC WITH AUTOMATED DIFF    Collection Time: 01/20/23  3:00 PM   Result Value Ref Range    WBC 8.8 3.6 - 11.0 K/uL    RBC 4.12 3.80 - 5.20 M/uL    HGB 12.5 11.5 - 16.0 g/dL    HCT 35.0 35.0 - 47.0 %    MCV 85.0 80.0 - 99.0 FL    MCH 30.3 26.0 - 34.0 PG    MCHC 35.7 30.0 - 36.5 g/dL    RDW 12.8 11.5 - 14.5 %    PLATELET 510 483 - 883 K/uL    MPV 9.4 8.9 - 12.9 FL    NRBC 0.0 0  WBC    ABSOLUTE NRBC 0.00 0.00 - 0.01 K/uL    NEUTROPHILS 77 (H) 32 - 75 %    LYMPHOCYTES 14 12 - 49 %    MONOCYTES 6 5 - 13 %    EOSINOPHILS 2 0 - 7 %    BASOPHILS 1 0 - 1 %    IMMATURE GRANULOCYTES 0 0.0 - 0.5 %    ABS. NEUTROPHILS 6.9 1.8 - 8.0 K/UL    ABS. LYMPHOCYTES 1.2 0.8 - 3.5 K/UL    ABS. MONOCYTES 0.5 0.0 - 1.0 K/UL    ABS. EOSINOPHILS 0.1 0.0 - 0.4 K/UL    ABS. BASOPHILS 0.1 0.0 - 0.1 K/UL    ABS. IMM. GRANS. 0.0 0.00 - 0.04 K/UL    DF AUTOMATED     METABOLIC PANEL, COMPREHENSIVE    Collection Time: 01/20/23  3:00 PM   Result Value Ref Range    Sodium 136 136 - 145 mmol/L    Potassium 4.3 3.5 - 5.1 mmol/L    Chloride 100 97 - 108 mmol/L    CO2 28 21 - 32 mmol/L    Anion gap 8 5 - 15 mmol/L    Glucose 187 (H) 65 - 100 mg/dL    BUN 23 (H) 6 - 20 MG/DL    Creatinine 1.17 (H) 0.55 - 1.02 MG/DL    BUN/Creatinine ratio 20 12 - 20      eGFR 49 (L) >60 ml/min/1.73m2    Calcium 9.4 8.5 - 10.1 MG/DL    Bilirubin, total 0.4 0.2 - 1.0 MG/DL    ALT (SGPT) 19 12 - 78 U/L    AST (SGOT) 19 15 - 37 U/L    Alk.  phosphatase 67 45 - 117 U/L    Protein, total 7.3 6.4 - 8.2 g/dL    Albumin 3.8 3.5 - 5.0 g/dL    Globulin 3.5 2.0 - 4.0 g/dL    A-G Ratio 1.1 1.1 - 2.2     TROPONIN-HIGH SENSITIVITY    Collection Time: 01/20/23  3:00 PM   Result Value Ref Range    Troponin-High Sensitivity 6 0 - 51 ng/L   URINALYSIS W/ REFLEX CULTURE    Collection Time: 01/20/23  3:31 PM    Specimen: Urine   Result Value Ref Range    Color YELLOW/STRAW      Appearance CLEAR CLEAR      Specific gravity 1.015 1.003 - 1.030      pH (UA) 6.0 5.0 - 8.0      Protein Negative NEG mg/dL    Glucose Negative NEG mg/dL    Ketone Negative NEG mg/dL    Bilirubin Negative NEG      Blood Negative NEG      Urobilinogen 0.2 0.2 - 1.0 EU/dL    Nitrites Negative NEG      Leukocyte Esterase Negative NEG      WBC 0-4 0 - 4 /hpf    RBC 0-5 0 - 5 /hpf    Epithelial cells FEW FEW /lpf    Bacteria Negative NEG /hpf    UA:UC IF INDICATED CULTURE NOT INDICATED BY UA RESULT CNI          EKG: If performed, independent interpretation documented below in the MDM section     RADIOLOGY:  Non-plain film images such as CT, Ultrasound and MRI are read by the radiologist. Plain radiographic images are visualized and preliminarily interpreted by the ED Provider with the findings documented in the MDM section. Interpretation per the Radiologist below, if available at the time of this note:     No results found. PROCEDURES   Unless otherwise noted below, none  EKG    Date/Time: 1/20/2023 7:16 PM  Performed by: Artie Domingo MD  Authorized by: Artie Domingo MD     ECG reviewed by ED Physician in the absence of a cardiologist: yes    Interpretation:     Interpretation: non-specific    Rate:     ECG rate:  68    ECG rate assessment: normal    Rhythm:     Rhythm: sinus rhythm    Ectopy:     Ectopy: PAC    QRS:     QRS axis:  Normal    QRS intervals:  Normal    QRS conduction: normal    ST segments:     ST segments:  Normal  T waves:     T waves: normal       CRITICAL CARE TIME   0    EMERGENCY DEPARTMENT COURSE and DIFFERENTIAL DIAGNOSIS/MDM   Vitals:    Vitals:    01/20/23 1559 01/20/23 1607 01/20/23 1637 01/20/23 1707   BP:  111/66 (!) 148/72    Pulse: 63 63 73 63   Resp: 17 20 23 12   Temp:       SpO2:            Patient was given the following medications:  Medications - No data to display    Medical Decision Making  78-year-old female who presents with a now resolved episode of lightheadedness that occurred during chair yoga. She is well-appearing with stable vital signs on exam.  Initial orthostatics performed and patient is not currently orthostatic though she is currently symptom-free. We will check basic lab work to rule out significant electrolyte imbalance or anemia contributing to her symptoms. Will check EKG to rule out arrhythmia as well as a single troponin though I think this is less likely ACS.   Will check urinalysis as well to rule out UTI as patient had not these previously during an episode of lightheadedness. I suspect most likely she was not well enough hydrated and that is why she became lightheaded at the end of her chair yoga class. Amount and/or Complexity of Data Reviewed  Labs: ordered. Decision-making details documented in ED Course. ECG/medicine tests: ordered and independent interpretation performed. Decision-making details documented in ED Course. Lab work with mild hyperglycemia, creatinine of 1.17. No acute severe abnormalities to explain patient's symptoms. No anemia noted on CBC. Urinalysis without evidence of UTI. Patient is remained symptom-free while in the emergency department. Plan for discharge with outpatient follow-up with primary care. ED return precaution discussed. I also advised that she stay well-hydrated on days she does chair yoga. FINAL IMPRESSION     1. Near syncope          DISPOSITION/PLAN   Geno Newell  results have been reviewed with her. She has been counseled regarding her diagnosis, treatment, and plan. She verbally conveys understanding and agreement of the signs, symptoms, diagnosis, treatment and prognosis and additionally agrees to follow up as discussed. She also agrees with the care-plan and conveys that all of her questions have been answered. I have also provided discharge instructions for her that include: educational information regarding their diagnosis and treatment, and list of reasons why they would want to return to the ED prior to their follow-up appointment, should her condition change.      CLINICAL IMPRESSION         PATIENT REFERRED TO:  Follow-up Information       Follow up With Specialties Details Why Contact Kevin Ordoñez DO Internal Medicine Physician, Pediatric Medicine Schedule an appointment as soon as possible for a visit   Drake Kim [place]  [phone]                DISCHARGE MEDICATIONS:  Discharge Medication List as of 1/20/2023  5:06 PM            DISCONTINUED MEDICATIONS:  Discharge Medication List as of 1/20/2023  5:06 PM          I am the Primary Clinician of Record. Jmaes Knight MD (electronically signed)    (Please note that parts of this dictation were completed with voice recognition software. Quite often unanticipated grammatical, syntax, homophones, and other interpretive errors are inadvertently transcribed by the computer software. Please disregards these errors.  Please excuse any errors that have escaped final proofreading.)

## 2023-01-27 DIAGNOSIS — I25.83 CORONARY ARTERY DISEASE DUE TO LIPID RICH PLAQUE: ICD-10-CM

## 2023-01-27 DIAGNOSIS — I25.10 CORONARY ARTERY DISEASE INVOLVING NATIVE CORONARY ARTERY OF NATIVE HEART WITHOUT ANGINA PECTORIS: ICD-10-CM

## 2023-01-27 DIAGNOSIS — I25.10 CORONARY ARTERY DISEASE DUE TO LIPID RICH PLAQUE: ICD-10-CM

## 2023-01-27 DIAGNOSIS — E78.00 PURE HYPERCHOLESTEROLEMIA: ICD-10-CM

## 2023-01-27 RX ORDER — ROSUVASTATIN CALCIUM 20 MG/1
TABLET, COATED ORAL
Qty: 90 TABLET | Refills: 0 | Status: SHIPPED | OUTPATIENT
Start: 2023-01-27

## 2023-04-03 PROBLEM — E11.65 HYPERGLYCEMIA DUE TO TYPE 2 DIABETES MELLITUS (HCC): Status: RESOLVED | Noted: 2020-07-21 | Resolved: 2021-05-11

## 2023-09-18 RX ORDER — SPIRONOLACTONE AND HYDROCHLOROTHIAZIDE 25; 25 MG/1; MG/1
TABLET ORAL
Qty: 45 TABLET | Refills: 0 | OUTPATIENT
Start: 2023-09-18

## 2023-09-25 RX ORDER — SPIRONOLACTONE AND HYDROCHLOROTHIAZIDE 25; 25 MG/1; MG/1
TABLET ORAL
Qty: 45 TABLET | Refills: 0 | OUTPATIENT
Start: 2023-09-25

## 2023-09-29 RX ORDER — SPIRONOLACTONE AND HYDROCHLOROTHIAZIDE 25; 25 MG/1; MG/1
TABLET ORAL
Qty: 45 TABLET | Refills: 0 | OUTPATIENT
Start: 2023-09-29

## 2024-01-02 ENCOUNTER — HOSPITAL ENCOUNTER (OUTPATIENT)
Facility: HOSPITAL | Age: 74
Discharge: HOME | End: 2024-01-05
Payer: MEDICARE

## 2024-01-02 DIAGNOSIS — Z78.0 ASYMPTOMATIC MENOPAUSE: ICD-10-CM

## 2024-01-02 PROCEDURE — 77080 DXA BONE DENSITY AXIAL: CPT

## 2024-06-21 RX ORDER — MAGNESIUM OXIDE TAB 400 MG (241.3 MG ELEMENTAL MG) 400 (241.3 MG) MG
400 TAB ORAL 3 TIMES DAILY
Qty: 90 TABLET | Refills: 0 | OUTPATIENT
Start: 2024-06-21

## 2024-08-29 ENCOUNTER — TRANSCRIBE ORDERS (OUTPATIENT)
Facility: HOSPITAL | Age: 74
End: 2024-08-29

## 2024-08-29 DIAGNOSIS — Z12.31 ENCOUNTER FOR SCREENING MAMMOGRAM FOR MALIGNANT NEOPLASM OF BREAST: Primary | ICD-10-CM

## 2024-09-11 ENCOUNTER — HOSPITAL ENCOUNTER (OUTPATIENT)
Facility: HOSPITAL | Age: 74
Discharge: HOME | End: 2024-09-14
Payer: MEDICARE

## 2024-09-11 DIAGNOSIS — Z12.31 ENCOUNTER FOR SCREENING MAMMOGRAM FOR MALIGNANT NEOPLASM OF BREAST: ICD-10-CM

## 2024-09-11 PROCEDURE — 77063 BREAST TOMOSYNTHESIS BI: CPT
